# Patient Record
Sex: FEMALE | Race: WHITE | NOT HISPANIC OR LATINO | URBAN - METROPOLITAN AREA
[De-identification: names, ages, dates, MRNs, and addresses within clinical notes are randomized per-mention and may not be internally consistent; named-entity substitution may affect disease eponyms.]

---

## 2021-10-12 ENCOUNTER — INPATIENT (INPATIENT)
Facility: HOSPITAL | Age: 86
LOS: 6 days | Discharge: NURSING, REHAB OR LONG-TERM CARE | DRG: 312 | End: 2021-10-19
Attending: FAMILY MEDICINE | Admitting: FAMILY MEDICINE
Payer: COMMERCIAL

## 2021-10-12 VITALS
TEMPERATURE: 98 F | RESPIRATION RATE: 18 BRPM | SYSTOLIC BLOOD PRESSURE: 181 MMHG | WEIGHT: 139.99 LBS | DIASTOLIC BLOOD PRESSURE: 99 MMHG | HEART RATE: 85 BPM | OXYGEN SATURATION: 98 %

## 2021-10-12 DIAGNOSIS — R55 SYNCOPE AND COLLAPSE: ICD-10-CM

## 2021-10-12 LAB
ALBUMIN SERPL ELPH-MCNC: 3.5 G/DL — SIGNIFICANT CHANGE UP (ref 3.3–5)
ALP SERPL-CCNC: 71 U/L — SIGNIFICANT CHANGE UP (ref 40–120)
ALT FLD-CCNC: 29 U/L — SIGNIFICANT CHANGE UP (ref 12–78)
ANION GAP SERPL CALC-SCNC: 3 MMOL/L — LOW (ref 5–17)
APTT BLD: 28 SEC — SIGNIFICANT CHANGE UP (ref 27.5–35.5)
AST SERPL-CCNC: 40 U/L — HIGH (ref 15–37)
BASOPHILS # BLD AUTO: 0.05 K/UL — SIGNIFICANT CHANGE UP (ref 0–0.2)
BASOPHILS NFR BLD AUTO: 0.4 % — SIGNIFICANT CHANGE UP (ref 0–2)
BILIRUB SERPL-MCNC: 0.5 MG/DL — SIGNIFICANT CHANGE UP (ref 0.2–1.2)
BUN SERPL-MCNC: 37 MG/DL — HIGH (ref 7–23)
CALCIUM SERPL-MCNC: 10.1 MG/DL — SIGNIFICANT CHANGE UP (ref 8.5–10.1)
CHLORIDE SERPL-SCNC: 106 MMOL/L — SIGNIFICANT CHANGE UP (ref 96–108)
CK MB BLD-MCNC: 2.9 % — SIGNIFICANT CHANGE UP (ref 0–3.5)
CK MB CFR SERPL CALC: 2.4 NG/ML — SIGNIFICANT CHANGE UP (ref 0–3.6)
CK SERPL-CCNC: 83 U/L — SIGNIFICANT CHANGE UP (ref 26–192)
CO2 SERPL-SCNC: 29 MMOL/L — SIGNIFICANT CHANGE UP (ref 22–31)
CREAT SERPL-MCNC: 1.1 MG/DL — SIGNIFICANT CHANGE UP (ref 0.5–1.3)
EOSINOPHIL # BLD AUTO: 0.08 K/UL — SIGNIFICANT CHANGE UP (ref 0–0.5)
EOSINOPHIL NFR BLD AUTO: 0.6 % — SIGNIFICANT CHANGE UP (ref 0–6)
GLUCOSE SERPL-MCNC: 144 MG/DL — HIGH (ref 70–99)
HCT VFR BLD CALC: 42.7 % — SIGNIFICANT CHANGE UP (ref 34.5–45)
HGB BLD-MCNC: 14.1 G/DL — SIGNIFICANT CHANGE UP (ref 11.5–15.5)
IMM GRANULOCYTES NFR BLD AUTO: 1.3 % — SIGNIFICANT CHANGE UP (ref 0–1.5)
INR BLD: 1.02 RATIO — SIGNIFICANT CHANGE UP (ref 0.88–1.16)
LYMPHOCYTES # BLD AUTO: 1.61 K/UL — SIGNIFICANT CHANGE UP (ref 1–3.3)
LYMPHOCYTES # BLD AUTO: 11.8 % — LOW (ref 13–44)
MCHC RBC-ENTMCNC: 30.9 PG — SIGNIFICANT CHANGE UP (ref 27–34)
MCHC RBC-ENTMCNC: 33 GM/DL — SIGNIFICANT CHANGE UP (ref 32–36)
MCV RBC AUTO: 93.4 FL — SIGNIFICANT CHANGE UP (ref 80–100)
MONOCYTES # BLD AUTO: 0.51 K/UL — SIGNIFICANT CHANGE UP (ref 0–0.9)
MONOCYTES NFR BLD AUTO: 3.7 % — SIGNIFICANT CHANGE UP (ref 2–14)
NEUTROPHILS # BLD AUTO: 11.18 K/UL — HIGH (ref 1.8–7.4)
NEUTROPHILS NFR BLD AUTO: 82.2 % — HIGH (ref 43–77)
NRBC # BLD: 0 /100 WBCS — SIGNIFICANT CHANGE UP (ref 0–0)
NT-PROBNP SERPL-SCNC: 196 PG/ML — SIGNIFICANT CHANGE UP (ref 0–450)
PLATELET # BLD AUTO: 248 K/UL — SIGNIFICANT CHANGE UP (ref 150–400)
POTASSIUM SERPL-MCNC: 4.7 MMOL/L — SIGNIFICANT CHANGE UP (ref 3.5–5.3)
POTASSIUM SERPL-SCNC: 4.7 MMOL/L — SIGNIFICANT CHANGE UP (ref 3.5–5.3)
PROT SERPL-MCNC: 8 G/DL — SIGNIFICANT CHANGE UP (ref 6–8.3)
PROTHROM AB SERPL-ACNC: 11.9 SEC — SIGNIFICANT CHANGE UP (ref 10.6–13.6)
RBC # BLD: 4.57 M/UL — SIGNIFICANT CHANGE UP (ref 3.8–5.2)
RBC # FLD: 13.2 % — SIGNIFICANT CHANGE UP (ref 10.3–14.5)
SARS-COV-2 RNA SPEC QL NAA+PROBE: SIGNIFICANT CHANGE UP
SODIUM SERPL-SCNC: 138 MMOL/L — SIGNIFICANT CHANGE UP (ref 135–145)
TROPONIN I SERPL-MCNC: <.015 NG/ML — SIGNIFICANT CHANGE UP (ref 0.01–0.04)
WBC # BLD: 13.61 K/UL — HIGH (ref 3.8–10.5)
WBC # FLD AUTO: 13.61 K/UL — HIGH (ref 3.8–10.5)

## 2021-10-12 PROCEDURE — 72125 CT NECK SPINE W/O DYE: CPT | Mod: 26,MG

## 2021-10-12 PROCEDURE — 71260 CT THORAX DX C+: CPT | Mod: 26,MG

## 2021-10-12 PROCEDURE — G1004: CPT

## 2021-10-12 PROCEDURE — 70450 CT HEAD/BRAIN W/O DYE: CPT | Mod: 26,MG

## 2021-10-12 PROCEDURE — 71045 X-RAY EXAM CHEST 1 VIEW: CPT | Mod: 26

## 2021-10-12 PROCEDURE — 99285 EMERGENCY DEPT VISIT HI MDM: CPT

## 2021-10-12 PROCEDURE — 74177 CT ABD & PELVIS W/CONTRAST: CPT | Mod: 26,MG

## 2021-10-12 PROCEDURE — 99222 1ST HOSP IP/OBS MODERATE 55: CPT

## 2021-10-12 RX ORDER — CHOLECALCIFEROL (VITAMIN D3) 125 MCG
1 CAPSULE ORAL
Qty: 0 | Refills: 0 | DISCHARGE

## 2021-10-12 RX ORDER — MORPHINE SULFATE 50 MG/1
4 CAPSULE, EXTENDED RELEASE ORAL ONCE
Refills: 0 | Status: DISCONTINUED | OUTPATIENT
Start: 2021-10-12 | End: 2021-10-12

## 2021-10-12 RX ORDER — ASPIRIN/CALCIUM CARB/MAGNESIUM 324 MG
81 TABLET ORAL DAILY
Refills: 0 | Status: DISCONTINUED | OUTPATIENT
Start: 2021-10-12 | End: 2021-10-14

## 2021-10-12 RX ORDER — ASCORBIC ACID 60 MG
1 TABLET,CHEWABLE ORAL
Qty: 0 | Refills: 0 | DISCHARGE

## 2021-10-12 RX ORDER — ACETAMINOPHEN 500 MG
1000 TABLET ORAL ONCE
Refills: 0 | Status: COMPLETED | OUTPATIENT
Start: 2021-10-12 | End: 2021-10-12

## 2021-10-12 RX ORDER — LIDOCAINE 4 G/100G
1 CREAM TOPICAL
Qty: 0 | Refills: 0 | DISCHARGE

## 2021-10-12 RX ORDER — HEPARIN SODIUM 5000 [USP'U]/ML
5000 INJECTION INTRAVENOUS; SUBCUTANEOUS EVERY 12 HOURS
Refills: 0 | Status: DISCONTINUED | OUTPATIENT
Start: 2021-10-12 | End: 2021-10-14

## 2021-10-12 RX ORDER — LANOLIN/MINERAL OIL
1 LOTION (ML) TOPICAL
Qty: 0 | Refills: 0 | DISCHARGE

## 2021-10-12 RX ORDER — SODIUM CHLORIDE 9 MG/ML
1000 INJECTION INTRAMUSCULAR; INTRAVENOUS; SUBCUTANEOUS ONCE
Refills: 0 | Status: COMPLETED | OUTPATIENT
Start: 2021-10-12 | End: 2021-10-12

## 2021-10-12 RX ORDER — ACETAMINOPHEN 500 MG
650 TABLET ORAL EVERY 6 HOURS
Refills: 0 | Status: DISCONTINUED | OUTPATIENT
Start: 2021-10-12 | End: 2021-10-13

## 2021-10-12 RX ORDER — CHOLECALCIFEROL (VITAMIN D3) 125 MCG
1000 CAPSULE ORAL DAILY
Refills: 0 | Status: DISCONTINUED | OUTPATIENT
Start: 2021-10-12 | End: 2021-10-19

## 2021-10-12 RX ORDER — ASCORBIC ACID 60 MG
500 TABLET,CHEWABLE ORAL DAILY
Refills: 0 | Status: DISCONTINUED | OUTPATIENT
Start: 2021-10-12 | End: 2021-10-19

## 2021-10-12 RX ORDER — ASPIRIN/CALCIUM CARB/MAGNESIUM 324 MG
1 TABLET ORAL
Qty: 0 | Refills: 0 | DISCHARGE

## 2021-10-12 RX ORDER — METHOCARBAMOL 500 MG/1
500 TABLET, FILM COATED ORAL THREE TIMES A DAY
Refills: 0 | Status: DISCONTINUED | OUTPATIENT
Start: 2021-10-12 | End: 2021-10-19

## 2021-10-12 RX ORDER — HYDRALAZINE HCL 50 MG
10 TABLET ORAL ONCE
Refills: 0 | Status: COMPLETED | OUTPATIENT
Start: 2021-10-12 | End: 2021-10-12

## 2021-10-12 RX ADMIN — Medication 1000 MILLIGRAM(S): at 13:23

## 2021-10-12 RX ADMIN — Medication 650 MILLIGRAM(S): at 20:33

## 2021-10-12 RX ADMIN — Medication 400 MILLIGRAM(S): at 13:18

## 2021-10-12 RX ADMIN — Medication 650 MILLIGRAM(S): at 18:45

## 2021-10-12 RX ADMIN — SODIUM CHLORIDE 1000 MILLILITER(S): 9 INJECTION INTRAMUSCULAR; INTRAVENOUS; SUBCUTANEOUS at 11:00

## 2021-10-12 RX ADMIN — MORPHINE SULFATE 4 MILLIGRAM(S): 50 CAPSULE, EXTENDED RELEASE ORAL at 10:59

## 2021-10-12 RX ADMIN — HEPARIN SODIUM 5000 UNIT(S): 5000 INJECTION INTRAVENOUS; SUBCUTANEOUS at 18:45

## 2021-10-12 RX ADMIN — SODIUM CHLORIDE 1000 MILLILITER(S): 9 INJECTION INTRAMUSCULAR; INTRAVENOUS; SUBCUTANEOUS at 12:00

## 2021-10-12 RX ADMIN — Medication 10 MILLIGRAM(S): at 17:12

## 2021-10-12 RX ADMIN — MORPHINE SULFATE 4 MILLIGRAM(S): 50 CAPSULE, EXTENDED RELEASE ORAL at 11:14

## 2021-10-12 RX ADMIN — METHOCARBAMOL 500 MILLIGRAM(S): 500 TABLET, FILM COATED ORAL at 20:35

## 2021-10-12 NOTE — H&P ADULT - ASSESSMENT
KELSI LONGORIA is a 89 YO Female brought to ED after syncope episode.  Patient reports she was having a bowel movement in which she next woke up on the floor.

## 2021-10-12 NOTE — CONSULT NOTE ADULT - SUBJECTIVE AND OBJECTIVE BOX
Patient is a 89y old  Female who presents with a chief complaint of     HPI: 89 yo female with PMH HTN and glaucoma presents to ED after syncopal episode. Patient reports sleeping poorly last night, waking up to get ready for work this morning and needing to use the bathroom several times. Patient reports several normal BMs this morning s/p taking laxatives yesterday for chronic constipation. Patient reports she felt tired however denies other prodromal symptoms. Pt went to the bathroom where she suddenly lost consciousness Patient doesn't remember much however states she believes she was getting up after using the toilet and suddenly passed out. Patient came to without any idea how long she had been unconscious and began calling for her daughter Her daughter found her sitting in the hallway without her undergarments on. Daughter states she flushed the toilet, got the pt into a chair and deciding to come to ED. As per daughter, pt has hx of previous syncopal episodes in her 20s without a diagnosable etiology. Last episode was 2012 where patient had overeaten at a very small tight restaurant and suddenly passed out. She was taken to [place] however felt better and left before a workup was completed. Patient doesn't have a cardiologist, checks up with PCP only. Reports stress test and carotid US negative within the past 5 years. Denies other cardiac history.       PAST MEDICAL & SURGICAL HISTORY:  HTN (hypertension)    Glaucoma      MEDICATIONS  (STANDING):    MEDICATIONS  (PRN):      FAMILY HISTORY:  Denies Family history of CAD or early MI    Constitutional: denies fever, chills  HEENT: denies blurry vision, difficulty hearing  Respiratory: denies SOB, BESS, cough  Cardiovascular: denies CP, palpitations, orthopnea, PND, LE edema  Gastrointestinal: denies nausea, vomiting, abdominal pain. Admits epigastric pain.   Genitourinary: denies urinary changes  Skin: Denies rashes, itching  Neurologic: denies headache, weakness, dizziness  Hematology/Oncology: denies bleeding, easy bruising    SOCIAL HISTORY: No tobacco, Alcohol or Drug use    Vital Signs Last 24 Hrs  T(C): 36.8 (12 Oct 2021 12:15), Max: 36.8 (12 Oct 2021 12:15)  T(F): 98.2 (12 Oct 2021 12:15), Max: 98.2 (12 Oct 2021 12:15)  HR: 70 (12 Oct 2021 12:15) (70 - 85)  BP: 168/82 (12 Oct 2021 12:15) (168/82 - 181/99)  BP(mean): --  RR: 17 (12 Oct 2021 12:15) (17 - 18)  SpO2: 95% (12 Oct 2021 12:15) (95% - 98%)    Physical Exam:  General: Well developed, well nourished, NAD  HEENT: NCAT, EOMI bl, moist mucous membranes   Neck: Supple, nontender, no mass  Neurology: A&Ox3, nonfocal, sensation intact   Respiratory: CTA B/L, No W/R/R  CV: RRR, +S1/S2, no murmurs, rubs or gallops  Abdominal: Soft, NT, ND +BSx4, no palpable masses  Extremities: No C/C/E, + peripheral pulses  MSK: Normal ROM, no joint erythema or warmth, no joint swelling   Heme: No obvious ecchymosis or petechiae   Skin: warm, dry, normal color    ECG:    I&O's Detail      LABS:                        14.1   13.61 )-----------( 248      ( 12 Oct 2021 11:21 )             42.7     10-12    138  |  106  |  37<H>  ----------------------------<  144<H>  4.7   |  29  |  1.10    Ca    10.1      12 Oct 2021 11:21    TPro  8.0  /  Alb  3.5  /  TBili  0.5  /  DBili  x   /  AST  40<H>  /  ALT  29  /  AlkPhos  71  10-12    CARDIAC MARKERS ( 12 Oct 2021 11:21 )  <.015 ng/mL / x     / x     / x     / x          PT/INR - ( 12 Oct 2021 11:21 )   PT: 11.9 sec;   INR: 1.02 ratio         PTT - ( 12 Oct 2021 11:21 )  PTT:28.0 sec    I&O's Summary    BNPSerum Pro-Brain Natriuretic Peptide: 196 pg/mL (10-12 @ 11:21)    RADIOLOGY & ADDITIONAL STUDIES: Patient is a 89y old  Female who presents with a chief complaint of     HPI: 89 yo female with PMH HTN and glaucoma presents to ED after syncopal episode. Patient reports sleeping poorly last night, waking up to get ready for work this morning and needing to use the bathroom several times. Patient reports eating her usual breakfast. Patient reports several normal BMs this morning s/p taking laxatives yesterday for chronic constipation. Patient reports she felt tired however denies other prodromal symptoms. Pt went to the bathroom where she suddenly lost consciousness Patient doesn't remember much however states she believes she was getting up after using the toilet and suddenly passed out. Patient came to without any idea how long she had been unconscious and began calling for her daughter Her daughter found her sitting in the hallway without her undergarments on. Daughter states she flushed the toilet, got the pt into a chair and deciding to come to ED. As per daughter, pt has hx of previous syncopal episodes in her 20s without a diagnosable etiology. Last episode was  where patient had overeaten at a very small tight restaurant and suddenly passed out. She was taken to [place] however felt better and left before a workup was completed. Patient doesn't have a cardiologist, checks up with PCP only. Reports stress test and carotid US negative within the past 5 years. Denies other cardiac history. Denies chest pain, palpitations, dizziness lightheaded, shortness of breath.       PAST MEDICAL & SURGICAL HISTORY:  HTN (hypertension)    Glaucoma      MEDICATIONS  (STANDING):    MEDICATIONS  (PRN):      FAMILY HISTORY:  Denies Family history of CAD or early MI    Constitutional: denies fever, chills  HEENT: denies blurry vision, difficulty hearing  Respiratory: denies SOB, BESS, cough  Cardiovascular: denies CP, palpitations, orthopnea, PND, LE edema  Gastrointestinal: denies nausea, vomiting, abdominal pain. Admits epigastric pain.   Genitourinary: denies urinary changes  Skin: Denies rashes, itching  Neurologic: denies headache, weakness, dizziness  Hematology/Oncology: denies bleeding, easy bruising    SOCIAL HISTORY: No tobacco, Alcohol or Drug use    Vital Signs Last 24 Hrs  T(C): 36.8 (12 Oct 2021 12:15), Max: 36.8 (12 Oct 2021 12:15)  T(F): 98.2 (12 Oct 2021 12:15), Max: 98.2 (12 Oct 2021 12:15)  HR: 70 (12 Oct 2021 12:15) (70 - 85)  BP: 168/82 (12 Oct 2021 12:15) (168/82 - 181/99)  BP(mean): --  RR: 17 (12 Oct 2021 12:15) (17 - 18)  SpO2: 95% (12 Oct 2021 12:15) (95% - 98%)    Physical Exam:  General: Well developed, well nourished, NAD  HEENT: NCAT, EOMI bl, moist mucous membranes   Neck: Supple, nontender, no mass  Neurology: A&Ox3, nonfocal, sensation intact   Respiratory: CTA B/L, No W/R/R  CV: RRR, +S1/S2, no murmurs, rubs or gallops  Abdominal: Soft, NT, ND +BSx4, no palpable masses  Extremities: No C/C/E, + peripheral pulses  MSK: Normal ROM, no joint erythema or warmth, no joint swelling   Heme: No obvious ecchymosis or petechiae   Skin: warm, dry, normal color    ECst degree AV block, LVH. No ST wave abnormalities.     I&O's Detail      LABS:                        14.1   13.61 )-----------( 248      ( 12 Oct 2021 11:21 )             42.7     10-12    138  |  106  |  37<H>  ----------------------------<  144<H>  4.7   |  29  |  1.10    Ca    10.1      12 Oct 2021 11:21    TPro  8.0  /  Alb  3.5  /  TBili  0.5  /  DBili  x   /  AST  40<H>  /  ALT  29  /  AlkPhos  71  10-12    CARDIAC MARKERS ( 12 Oct 2021 11:21 )  <.015 ng/mL / x     / x     / x     / x          PT/INR - ( 12 Oct 2021 11:21 )   PT: 11.9 sec;   INR: 1.02 ratio         PTT - ( 12 Oct 2021 11:21 )  PTT:28.0 sec    I&O's Summary    BNPSerum Pro-Brain Natriuretic Peptide: 196 pg/mL (10-12 @ 11:21)    RADIOLOGY & ADDITIONAL STUDIES: Patient is a 89y old  Female who presents with a chief complaint of     HPI: 89 yo female with PMH HTN and glaucoma presents to ED after syncopal episode. Patient reports sleeping poorly last night, waking up to get ready for work this morning and needing to use the bathroom several times. Patient reports eating her usual breakfast. Patient reports several normal BMs this morning s/p taking laxatives yesterday for chronic constipation. Patient reports she felt tired however denies other prodromal symptoms. Pt went to the bathroom where she suddenly lost consciousness Patient doesn't remember much however states she believes she was getting up after using the toilet and suddenly passed out. Patient came to without any idea how long she had been unconscious and began calling for her daughter Her daughter found her sitting in the hallway without her undergarments on. Daughter states she flushed the toilet, got the pt into a chair and deciding to come to ED. As per daughter, pt has hx of previous syncopal episodes in her 20s without a diagnosable etiology. Last episode was  where patient had overeaten at a very small tight restaurant and suddenly passed out. She was taken to [place] however felt better and left before a workup was completed. Patient doesn't have a cardiologist, checks up with PCP only. Reports stress test and carotid US negative within the past 5 years. Denies other cardiac history. Denies chest pain, palpitations, dizziness lightheaded, shortness of breath. Patient now complaining of severe epigastric pain worse with movement however stable at rest after morphine and ofirmev.       PAST MEDICAL & SURGICAL HISTORY:  HTN (hypertension)    Glaucoma      MEDICATIONS  (STANDING):    MEDICATIONS  (PRN):      FAMILY HISTORY:  Denies Family history of CAD or early MI    Constitutional: denies fever, chills  HEENT: denies blurry vision, difficulty hearing  Respiratory: denies SOB, BESS, cough  Cardiovascular: denies CP, palpitations, orthopnea, PND, LE edema  Gastrointestinal: denies nausea, vomiting, abdominal pain. Admits epigastric pain.   Genitourinary: denies urinary changes  Skin: Denies rashes, itching  Neurologic: denies headache, weakness, dizziness  Hematology/Oncology: denies bleeding, easy bruising    SOCIAL HISTORY: No tobacco, Alcohol or Drug use    Vital Signs Last 24 Hrs  T(C): 36.8 (12 Oct 2021 12:15), Max: 36.8 (12 Oct 2021 12:15)  T(F): 98.2 (12 Oct 2021 12:15), Max: 98.2 (12 Oct 2021 12:15)  HR: 70 (12 Oct 2021 12:15) (70 - 85)  BP: 168/82 (12 Oct 2021 12:15) (168/82 - 181/99)  BP(mean): --  RR: 17 (12 Oct 2021 12:15) (17 - 18)  SpO2: 95% (12 Oct 2021 12:15) (95% - 98%)    Physical Exam:  General: Well developed, well nourished, NAD  HEENT: NCAT, EOMI bl, moist mucous membranes   Neck: Supple, nontender, no mass  Neurology: A&Ox3, nonfocal, sensation intact   Respiratory: CTA B/L, No W/R/R  CV: RRR, +S1/S2, no murmurs, rubs or gallops  Abdominal: Soft, NT, ND +BSx4, no palpable masses  Extremities: No C/C/E, + peripheral pulses  MSK: Normal ROM, no joint erythema or warmth, no joint swelling   Heme: No obvious ecchymosis or petechiae   Skin: warm, dry, normal color    ECst degree AV block, LVH. No ST wave abnormalities.     I&O's Detail      LABS:                        14.1   13.61 )-----------( 248      ( 12 Oct 2021 11:21 )             42.7     10-12    138  |  106  |  37<H>  ----------------------------<  144<H>  4.7   |  29  |  1.10    Ca    10.1      12 Oct 2021 11:21    TPro  8.0  /  Alb  3.5  /  TBili  0.5  /  DBili  x   /  AST  40<H>  /  ALT  29  /  AlkPhos  71  10-12    CARDIAC MARKERS ( 12 Oct 2021 11:21 )  <.015 ng/mL / x     / x     / x     / x          PT/INR - ( 12 Oct 2021 11:21 )   PT: 11.9 sec;   INR: 1.02 ratio         PTT - ( 12 Oct 2021 11:21 )  PTT:28.0 sec    I&O's Summary    BNPSerum Pro-Brain Natriuretic Peptide: 196 pg/mL (10-12 @ 11:21)    RADIOLOGY & ADDITIONAL STUDIES:

## 2021-10-12 NOTE — ED ADULT NURSE NOTE - NSIMPLEMENTINTERV_GEN_ALL_ED
Implemented All Fall Risk Interventions:  [place] to call system. Call bell, personal items and telephone within reach. Instruct patient to call for assistance. Room bathroom lighting operational. Non-slip footwear when patient is off stretcher. Physically safe environment: no spills, clutter or unnecessary equipment. Stretcher in lowest position, wheels locked, appropriate side rails in place. Provide visual cue, wrist band, yellow gown, etc. Monitor gait and stability. Monitor for mental status changes and reorient to person, place, and time. Review medications for side effects contributing to fall risk. Reinforce activity limits and safety measures with patient and family.

## 2021-10-12 NOTE — ED PROVIDER NOTE - CLINICAL SUMMARY MEDICAL DECISION MAKING FREE TEXT BOX
presents today due to syncope. pt reports she was having a bowel movement in which she next woke up on the floor. pt reports she has a hx of passing out but has not had any cardiac hx. pt reports she woke up with b/l rib/flank pain and chest pain. no prodromal symptoms. plan includes labs, EKG/troponin r/o CAD, CXR r/o pneumothorax/rib fx, Ct head r/o CVA, Ct cervical r/o fx, CT CAB r/o intrathoracic injury/fx, re-assess

## 2021-10-12 NOTE — ED PROVIDER NOTE - OBJECTIVE STATEMENT
89 y/o female with PMHx HTN and Glaucoma presents today due to syncope. pt reports she was having a bowel movement in which she next woke up on the floor. pt reports she has a hx of passing out but has not had any cardiac hx. pt reports she woke up with b/l rib/flank pain and chest pain. pt reports she did not have chest pain or any symptoms prior to waking up on floor. pt currently taking daily baby ASA. pt denies vomiting, headache, neck pain, abd pain, fever, diarrhea, numbness/weakness, slurred speech, or any other complaints.

## 2021-10-12 NOTE — ED PROVIDER NOTE - ATTENDING CONTRIBUTION TO CARE
89 yo F p/w was on toilet today, having a BM. Pt then woke up on floor. No known sx prior to fall. pt co mild HA, chest wall pain sp fall onto ground. No other recent CP. no BESS / easy recent fatigue. no fever/chills. no cough / uri. no recent illness. no neck pain / stiffness. no back pain. no ext pain or inj. no other recent trauma.  No other recent sx. Pt with no hx cardiac disease. NO other acute co.  exam; MM Moist. neck supple. no meningeal signs. abd soft NT. no hsm. no cvat. nl resp effort. no w/r/r. Mild tend to bl lat ribs, no crepitus. No other chest wall tend. no signs of head trauma. no spinal tend (c,t,l). FROM bl ext x 4, nl non-focal neuro exam. no other acute findings.  check labs, trop, trauma CT, outpt fu

## 2021-10-12 NOTE — H&P ADULT - RS GEN PE MLT RESP DETAILS PC
airway patent/breath sounds equal/good air movement/respirations non-labored/diminished breath sounds, L/diminished breath sounds, R

## 2021-10-12 NOTE — ED ADULT NURSE NOTE - OBJECTIVE STATEMENT
Presents to ER with syncope. Passed out on toilet bowl, awoke on the floor and crawled out into the hallway to get help from her daughter.  Denies any headaches, dizziness, or SOB. Pt states she has bilat rib pains. Presents to ER with syncope. Passed out on toilet bowl, awoke on the floor and crawled out into the hallway to get help from her daughter.  Denies any headaches, dizziness, or SOB. Pt states she has bilat rib pains and severe epigastric pain.  Placed on continuous cardiac monitoring.

## 2021-10-12 NOTE — CONSULT NOTE ADULT - ATTENDING COMMENTS
syncope overall seems most likely to be on the basis of dehydration and perhaps a vasovagal phenomenon  she has a first degree avb on ekg, which is likely not material  she has severe pain with ambulation and cannot clearly walk and be safe at home, and so is to be admitted  echo, tele while admitted  bp was initially high, improved somewhat and then spiked higher again  resume her usual home medication for htn  can utilize hydralazine 10mg iv q6 as needed for sustained sbp>170

## 2021-10-12 NOTE — ED ADULT NURSE REASSESSMENT NOTE - NS ED NURSE REASSESS COMMENT FT1
pt reavaluated  and vital signs stable admitted to tele report given transfer with cardiac monitor in no distress

## 2021-10-12 NOTE — H&P ADULT - HISTORY OF PRESENT ILLNESS
MA        · Chief Complaint: The patient is a 89y Female complaining of syncope.  · HPI Objective Statement: 89 y/o female with PMHx HTN and Glaucoma presents today due to syncope. pt reports she was having a bowel movement in which she next woke up on the floor. pt reports she has a hx of passing out but has not had any cardiac hx. pt reports she woke up with b/l rib/flank pain and chest pain. pt reports she did not have chest pain or any symptoms prior to waking up on floor. pt currently taking daily baby ASA. pt denies vomiting, headache, neck pain, abd pain, fever, diarrhea, numbness/weakness, slurred speech, or any other complaints.   KELSI LONGORIA is a 89 YO Female brought to ED after syncope episode.  Patient reports she was having a bowel movement in which she next woke up on the floor. Patient reports she has a history of passing out but has not had any cardiac hx. Patient reports she woke up with b/l rib/flank pain and chest pain. Patient reports she did not have chest pain or any symptoms prior to waking up on floor. Patient currently taking daily baby ASA. Patient denies vomiting, headache, neck pain, abd pain, fever, diarrhea, numbness/weakness, slurred speech, or any other complaints.  Patient with Past Medical History of HTN and Glaucoma.

## 2021-10-12 NOTE — CONSULT NOTE ADULT - ASSESSMENT
89 yo female with PMH HTN and glaucoma presents to ED after syncopal episode. Cariology consulted for syncope.     Syncope  - Possibly vaso vagal episode in the setting of repeat BMs  - Trops negative, unlikely ischemic event   - BNP wnl, no signs of volume overload  - EKG 1st degree sinus, unable to compare as no prior EKG on EMR  -     89 yo female with PMH HTN and glaucoma presents to ED after syncopal episode. Cariology consulted for syncope.     Syncope  - Possibly vaso vagal episode in the setting of repeat BMs  - Trops negative, unlikely ischemic event   - BNP wnl, no signs of volume overload  - EKG 1st degree sinus, unable to compare as no prior EKG on EMR  -      Epigastric pain  - Doesn't appear to be cardiac in nature.   - CT abd/pelvis showing "No evidence of acute visceral organ or bony injury. Intra and extrahepatic biliary dilatation and pancreatic ductal dilatation, likely related to the patient's stated age and a postcholecystectomy reservoir effect."     89 yo female with PMH HTN and glaucoma presents to ED after syncopal episode. Cariology consulted for syncope.     Syncope  - Possibly vaso vagal episode in the setting of repeat BMs  - Trops negative, unlikely ischemic event   - BNP wnl, no signs of volume overload  - EKG 1st degree sinus, unable to compare as no prior EKG on EMR  - No echo ever performed at per pt. Stress and carotid US <5 years ago wnl.   - No cardiac symptoms at this time.   - F/u outpt for routine workup    Epigastric pain  - Doesn't appear to be cardiac in nature.   - CT abd/pelvis showing "No evidence of acute visceral organ or bony injury. Intra and extrahepatic biliary dilatation and pancreatic ductal dilatation, likely related to the patient's stated age and a postcholecystectomy reservoir effect."  - Would recommend pain control     HTN  - BP well controlled, continue home enalipril   89 yo female with PMH HTN and glaucoma presents to ED after syncopal episode. Cariology consulted for syncope.     Syncope  - Possibly 2/2 vaso vagal episode in the setting of repeat BMs vs EKG changes vs dehydration 2/2 decreased PO intake. BUN elevated both outpatient and on hospital presentation.  - Trops negative, unlikely ischemic event   - BNP wnl, no signs of volume overload  - EKG 1st degree sinus, unable to compare as no prior EKG on EMR. Possible contributing factor to syncope. Would need halter monitor in out patient setting to confirm.   - No echo ever performed at per pt. Stress and carotid US <5 years ago wnl.   - No cardiac symptoms at this time.   - Would recommend routine echo and tele monitoring if pt admitted however from cardiac standpoint, no need for hospital admission. Patient can otherwise follow up outpatient cardiology for further workup.     Epigastric pain  - Doesn't appear to be cardiac in nature.   - CT abd/pelvis showing "No evidence of acute visceral organ or bony injury. Intra and extrahepatic biliary dilatation and pancreatic ductal dilatation, likely related to the patient's stated age and a postcholecystectomy reservoir effect."  - Would recommend pain control and ambulation before discharge from ED    HTN  - BP well controlled, continue home enalipril

## 2021-10-12 NOTE — ED PROVIDER NOTE - PHYSICAL EXAMINATION
Constitutional: Awake, Alert, non-toxic. NAD. Well appearing, well nourished.   HEAD: Normocephalic, atraumatic.   EYES: PERRL, EOM intact, conjunctiva and sclera are clear bilaterally. No raccoon eyes.   ENT: No rhinorrhea, normal pharynx, patent, no tonsillar exudate or enlargement, mucous membranes pink/moist, no erythema, no drooling or stridor.   NECK: Supple, non-tender  BACK: No midline or paraspinal TTP of cervical/thoracic/lumbar spine, FROM. No ecchymosis or hematomas. (+) mild left lateral rib TTP  CARDIOVASCULAR: Normal S1, S2; regular rate and rhythm.  RESPIRATORY: Normal respiratory effort; breath sounds CTAB, no wheezes, rhonchi, or rales. Speaking in full sentences. No accessory muscle use.   ABDOMEN: Soft; non-tender, non-distended.  EXTREMITIES: Full passive and active ROM in all extremities; hips non-tender to palpation; distal pulses palpable and symmetric, no edema, no crepitus or step off  SKIN: Warm, dry; good skin turgor, no apparent lesions or rashes, no ecchymosis, brisk capillary refill.  NEURO: A&O x3. Sensory and motor functions are grossly intact. Speech is normal. Appearance and judgement seem appropriate for gender and age. No neurological deficits. Neurovascular sensation intact motor function 5/5 of upper and lower extremities, CN II-XII grossly intact, no ataxia, absent pronator drift, intact cerebellar function. Speech clear, without articulation or word-finding difficulties. Eyes- PERRL bilaterally. EOMs in tact. No nystagmus. No facial droop.

## 2021-10-12 NOTE — H&P ADULT - NSHPLABSRESULTS_GEN_ALL_CORE
14.1   13.61 )-----------( 248      ( 12 Oct 2021 11:21 )             42.7     12 Oct 2021 11:21    138    |  106    |  37     ----------------------------<  144    4.7     |  29     |  1.10     Ca    10.1       12 Oct 2021 11:21    TPro  8.0    /  Alb  3.5    /  TBili  0.5    /  DBili  x      /  AST  40     /  ALT  29     /  AlkPhos  71     12 Oct 2021 11:21    LIVER FUNCTIONS - ( 12 Oct 2021 11:21 )  Alb: 3.5 g/dL / Pro: 8.0 g/dL / ALK PHOS: 71 U/L / ALT: 29 U/L / AST: 40 U/L / GGT: x           PT/INR - ( 12 Oct 2021 11:21 )   PT: 11.9 sec;   INR: 1.02 ratio       PTT - ( 12 Oct 2021 11:21 )  PTT:28.0 sec  CAPILLARY BLOOD GLUCOSE    CARDIAC MARKERS ( 12 Oct 2021 17:36 )  x     / x     / x     / x     / 2.4 ng/mL  CARDIAC MARKERS ( 12 Oct 2021 17:33 )  <.015 ng/mL / x     / 83 U/L / x     / x      CARDIAC MARKERS ( 12 Oct 2021 11:21 )  <.015 ng/mL / x     / x     / x     / x        < from: CT Abdomen and Pelvis w/ IV Cont (10.12.21 @ 12:44) >    IMPRESSION: No evidence of acute visceral organ or bony injury. Intra and extrahepatic biliary dilatation and pancreatic ductal dilatation, likely related to the patient's stated age and a postcholecystectomy reservoir effect.    --- End of Report ---    < end of copied text >    < from: CT Cervical Spine No Cont (10.12.21 @ 12:45) >    IMPRESSION:    No acute fracture identified.    < end of copied text >    < from: CT Head No Cont (10.12.21 @ 12:45) >      IMPRESSION:    1)  chronic ischemic changes noted in both hemispheres with volume loss. No acute abnormality or hemorrhagic lesion noted.  2)  clear sinuses and mastoids..    < end of copied text >    < from: CT Chest w/ IV Cont (10.12.21 @ 12:50) >    IMPRESSION: No evidence of acute visceral organ or bony injury. Intra and extrahepatic biliary dilatation and pancreatic ductal dilatation, likely related to the patient's stated age and a postcholecystectomy reservoir effect.    < end of copied text >

## 2021-10-13 LAB
A1C WITH ESTIMATED AVERAGE GLUCOSE RESULT: 6.3 % — HIGH (ref 4–5.6)
ALBUMIN SERPL ELPH-MCNC: 3.4 G/DL — SIGNIFICANT CHANGE UP (ref 3.3–5)
ALP SERPL-CCNC: 84 U/L — SIGNIFICANT CHANGE UP (ref 40–120)
ALT FLD-CCNC: 73 U/L — SIGNIFICANT CHANGE UP (ref 12–78)
ANION GAP SERPL CALC-SCNC: 7 MMOL/L — SIGNIFICANT CHANGE UP (ref 5–17)
AST SERPL-CCNC: 91 U/L — HIGH (ref 15–37)
BILIRUB SERPL-MCNC: 1.1 MG/DL — SIGNIFICANT CHANGE UP (ref 0.2–1.2)
BUN SERPL-MCNC: 34 MG/DL — HIGH (ref 7–23)
CALCIUM SERPL-MCNC: 9.6 MG/DL — SIGNIFICANT CHANGE UP (ref 8.5–10.1)
CHLORIDE SERPL-SCNC: 104 MMOL/L — SIGNIFICANT CHANGE UP (ref 96–108)
CHOLEST SERPL-MCNC: 244 MG/DL — HIGH
CO2 SERPL-SCNC: 27 MMOL/L — SIGNIFICANT CHANGE UP (ref 22–31)
CREAT SERPL-MCNC: 0.96 MG/DL — SIGNIFICANT CHANGE UP (ref 0.5–1.3)
ESTIMATED AVERAGE GLUCOSE: 134 MG/DL — HIGH (ref 68–114)
GLUCOSE SERPL-MCNC: 91 MG/DL — SIGNIFICANT CHANGE UP (ref 70–99)
HCT VFR BLD CALC: 41.6 % — SIGNIFICANT CHANGE UP (ref 34.5–45)
HDLC SERPL-MCNC: 58 MG/DL — SIGNIFICANT CHANGE UP
HGB BLD-MCNC: 13.8 G/DL — SIGNIFICANT CHANGE UP (ref 11.5–15.5)
LIPID PNL WITH DIRECT LDL SERPL: 164 MG/DL — HIGH
MCHC RBC-ENTMCNC: 30.9 PG — SIGNIFICANT CHANGE UP (ref 27–34)
MCHC RBC-ENTMCNC: 33.2 GM/DL — SIGNIFICANT CHANGE UP (ref 32–36)
MCV RBC AUTO: 93.3 FL — SIGNIFICANT CHANGE UP (ref 80–100)
MYOGLOBIN SERPL-MCNC: 129 NG/ML — HIGH (ref 25–58)
NON HDL CHOLESTEROL: 186 MG/DL — HIGH
NRBC # BLD: 0 /100 WBCS — SIGNIFICANT CHANGE UP (ref 0–0)
PLATELET # BLD AUTO: 219 K/UL — SIGNIFICANT CHANGE UP (ref 150–400)
POTASSIUM SERPL-MCNC: 4.2 MMOL/L — SIGNIFICANT CHANGE UP (ref 3.5–5.3)
POTASSIUM SERPL-SCNC: 4.2 MMOL/L — SIGNIFICANT CHANGE UP (ref 3.5–5.3)
PROT SERPL-MCNC: 7.4 G/DL — SIGNIFICANT CHANGE UP (ref 6–8.3)
RBC # BLD: 4.46 M/UL — SIGNIFICANT CHANGE UP (ref 3.8–5.2)
RBC # FLD: 13.3 % — SIGNIFICANT CHANGE UP (ref 10.3–14.5)
RHEUMATOID FACT SERPL-ACNC: 11 IU/ML — SIGNIFICANT CHANGE UP (ref 0–13)
SODIUM SERPL-SCNC: 138 MMOL/L — SIGNIFICANT CHANGE UP (ref 135–145)
T3 SERPL-MCNC: 86 NG/DL — SIGNIFICANT CHANGE UP (ref 80–200)
T4 AB SER-ACNC: 7 UG/DL — SIGNIFICANT CHANGE UP (ref 4.6–12)
TRIGL SERPL-MCNC: 109 MG/DL — SIGNIFICANT CHANGE UP
TSH SERPL-MCNC: 1.04 UIU/ML — SIGNIFICANT CHANGE UP (ref 0.36–3.74)
URATE SERPL-MCNC: 5 MG/DL — SIGNIFICANT CHANGE UP (ref 2.5–7)
WBC # BLD: 11.14 K/UL — HIGH (ref 3.8–10.5)
WBC # FLD AUTO: 11.14 K/UL — HIGH (ref 3.8–10.5)

## 2021-10-13 PROCEDURE — 93306 TTE W/DOPPLER COMPLETE: CPT | Mod: 26

## 2021-10-13 PROCEDURE — 99232 SBSQ HOSP IP/OBS MODERATE 35: CPT

## 2021-10-13 RX ORDER — TRAMADOL HYDROCHLORIDE 50 MG/1
25 TABLET ORAL EVERY 4 HOURS
Refills: 0 | Status: DISCONTINUED | OUTPATIENT
Start: 2021-10-13 | End: 2021-10-19

## 2021-10-13 RX ORDER — SODIUM CHLORIDE 9 MG/ML
1000 INJECTION INTRAMUSCULAR; INTRAVENOUS; SUBCUTANEOUS
Refills: 0 | Status: DISCONTINUED | OUTPATIENT
Start: 2021-10-13 | End: 2021-10-14

## 2021-10-13 RX ORDER — POLYETHYLENE GLYCOL 3350 17 G/17G
17 POWDER, FOR SOLUTION ORAL
Refills: 0 | Status: DISCONTINUED | OUTPATIENT
Start: 2021-10-13 | End: 2021-10-19

## 2021-10-13 RX ORDER — LIDOCAINE 4 G/100G
2 CREAM TOPICAL DAILY
Refills: 0 | Status: DISCONTINUED | OUTPATIENT
Start: 2021-10-13 | End: 2021-10-19

## 2021-10-13 RX ORDER — MORPHINE SULFATE 50 MG/1
2 CAPSULE, EXTENDED RELEASE ORAL ONCE
Refills: 0 | Status: DISCONTINUED | OUTPATIENT
Start: 2021-10-13 | End: 2021-10-13

## 2021-10-13 RX ORDER — ACETAMINOPHEN 500 MG
650 TABLET ORAL EVERY 6 HOURS
Refills: 0 | Status: DISCONTINUED | OUTPATIENT
Start: 2021-10-13 | End: 2021-10-19

## 2021-10-13 RX ADMIN — LIDOCAINE 2 PATCH: 4 CREAM TOPICAL at 19:18

## 2021-10-13 RX ADMIN — Medication 650 MILLIGRAM(S): at 12:01

## 2021-10-13 RX ADMIN — TRAMADOL HYDROCHLORIDE 25 MILLIGRAM(S): 50 TABLET ORAL at 18:00

## 2021-10-13 RX ADMIN — Medication 1000 UNIT(S): at 12:01

## 2021-10-13 RX ADMIN — METHOCARBAMOL 500 MILLIGRAM(S): 500 TABLET, FILM COATED ORAL at 02:23

## 2021-10-13 RX ADMIN — SODIUM CHLORIDE 50 MILLILITER(S): 9 INJECTION INTRAMUSCULAR; INTRAVENOUS; SUBCUTANEOUS at 15:23

## 2021-10-13 RX ADMIN — HEPARIN SODIUM 5000 UNIT(S): 5000 INJECTION INTRAVENOUS; SUBCUTANEOUS at 06:00

## 2021-10-13 RX ADMIN — Medication 650 MILLIGRAM(S): at 23:40

## 2021-10-13 RX ADMIN — METHOCARBAMOL 500 MILLIGRAM(S): 500 TABLET, FILM COATED ORAL at 22:16

## 2021-10-13 RX ADMIN — HEPARIN SODIUM 5000 UNIT(S): 5000 INJECTION INTRAVENOUS; SUBCUTANEOUS at 17:00

## 2021-10-13 RX ADMIN — TRAMADOL HYDROCHLORIDE 25 MILLIGRAM(S): 50 TABLET ORAL at 22:16

## 2021-10-13 RX ADMIN — Medication 650 MILLIGRAM(S): at 23:10

## 2021-10-13 RX ADMIN — Medication 650 MILLIGRAM(S): at 17:00

## 2021-10-13 RX ADMIN — POLYETHYLENE GLYCOL 3350 17 GRAM(S): 17 POWDER, FOR SOLUTION ORAL at 17:01

## 2021-10-13 RX ADMIN — MORPHINE SULFATE 2 MILLIGRAM(S): 50 CAPSULE, EXTENDED RELEASE ORAL at 04:30

## 2021-10-13 RX ADMIN — Medication 5 MILLIGRAM(S): at 06:00

## 2021-10-13 RX ADMIN — METHOCARBAMOL 500 MILLIGRAM(S): 500 TABLET, FILM COATED ORAL at 13:58

## 2021-10-13 RX ADMIN — Medication 650 MILLIGRAM(S): at 13:15

## 2021-10-13 RX ADMIN — Medication 500 MILLIGRAM(S): at 12:01

## 2021-10-13 RX ADMIN — LIDOCAINE 2 PATCH: 4 CREAM TOPICAL at 17:41

## 2021-10-13 RX ADMIN — TRAMADOL HYDROCHLORIDE 25 MILLIGRAM(S): 50 TABLET ORAL at 16:52

## 2021-10-13 RX ADMIN — MORPHINE SULFATE 2 MILLIGRAM(S): 50 CAPSULE, EXTENDED RELEASE ORAL at 04:01

## 2021-10-13 RX ADMIN — Medication 650 MILLIGRAM(S): at 18:00

## 2021-10-13 RX ADMIN — METHOCARBAMOL 500 MILLIGRAM(S): 500 TABLET, FILM COATED ORAL at 06:00

## 2021-10-13 RX ADMIN — Medication 81 MILLIGRAM(S): at 12:01

## 2021-10-13 NOTE — PROGRESS NOTE ADULT - ASSESSMENT
89 yo female with PMH HTN and glaucoma presents to ED after syncopal episode. Cariology consulted for syncope.     Syncope  - Trops negative, unlikely ischemic event   - BNP wnl, no signs of volume overload  - Stress and carotid US <5 years ago wnl.   - No cardiac symptoms at this time.   - syncope overall seems most likely to be on the basis of dehydration and perhaps a vasovagal phenomenon  - she has a first degree avb on ekg, which is likely not material  - No prior echo.  Follow up echo  - Tele SR 80s will continue for now  - + Orthostatic BP SBP lying 129  SBP sitting 131  SBP Standing 82  - Recommend compression stockings while oob.   - Caution with anti-hypertensive medications and Narcotics for pain.      - S/P 1 Liter IVF would encourage PO fluids.      Epigastric pain  - Doesn't appear to be cardiac in nature.   - CT abd/pelvis showing "No evidence of acute visceral organ or bony injury. Intra and extrahepatic biliary dilatation and pancreatic ductal dilatation, likely related to the patient's stated age and a postcholecystectomy reservoir effect."  - Has no reports of epigastric pain today    Musculoskeletal pain/Back pain  - CT of spine - chronic changes with no acute abnormality or hemorrhagic lesion noted.   - Pain control as per primary team  - PT/OT - recommends rehab upon d/c    HTN  - bp was initially high, improved somewhat and then spiked higher again  - Continue Enalapril home dose   - Routine hemodynamic monitoring    Orthostatic Hypotension  - Would encourage PO fluids and compression stockings.   - Would do daily orthostatic BP/HR until resolved    DVT PPX  - HSQ as per primary    - Monitor Electrolytes.  Maintain K >4 and Mag >2.  Replete prn.   - All other needs as per primary team  - Further cardiac workup as indicated by clinical course.  Will follow.    Eliza Colindres [facility]  Cardiology  Spectra [phone]           89 yo female with PMH HTN and glaucoma presents to ED after syncopal episode. Cariology consulted for syncope.     Syncope  - Trops negative, unlikely ischemic event   - BNP wnl, no signs of volume overload  - Stress and carotid US <5 years ago wnl.   - No cardiac symptoms at this time.   - syncope overall seems most likely to be on the basis of dehydration and perhaps a vasovagal phenomenon  - she has a first degree avb on ekg, which is likely not material  - No prior echo.  Follow up echo  - Tele SR 80s will continue for now  - + Orthostatic BP SBP lying 129  SBP sitting 131  SBP Standing 82  - Recommend compression stockings while oob.     Epigastric pain  - Doesn't appear to be cardiac in nature.   - CT abd/pelvis showing "No evidence of acute visceral organ or bony injury. Intra and extrahepatic biliary dilatation and pancreatic ductal dilatation, likely related to the patient's stated age and a postcholecystectomy reservoir effect."  - Has no reports of epigastric pain today    Musculoskeletal pain/Back pain  - CT of spine - chronic changes with no acute abnormality or hemorrhagic lesion noted.   - Pain control as per primary team  - PT/OT - recommends rehab upon d/c    HTN  - bp was initially high, improved somewhat and then spiked higher again  - Continue Enalapril home dose   - Routine hemodynamic monitoring    Orthostatic Hypotension  - Would encourage PO fluids and compression stockings.   - Would do daily orthostatic BP/HR until resolved  - Caution with anti-hypertensive medications and Narcotics for pain.      - S/P 1 Liter IVF would encourage PO fluids.      DVT PPX  - HSQ as per primary    - Monitor Electrolytes.  Maintain K >4 and Mag >2.  Replete prn.   - All other needs as per primary team  - Further cardiac workup as indicated by clinical course.  Will follow.    Eliza Colindres [facility]  Cardiology  Spectra [phone]

## 2021-10-13 NOTE — PROGRESS NOTE ADULT - ATTENDING COMMENTS
Chart reviewed    Patient seen and examined    Agree with plan as outlined above    89 yo female with PMH HTN and glaucoma presents to ED after syncopal episode. Cariology consulted for syncope.     Syncope  - Trops negative, unlikely ischemic event   - BNP wnl, no signs of volume overload  - Stress and carotid US <5 years ago wnl.   - No cardiac symptoms at this time.   - syncope overall seems most likely to be on the basis of dehydration and perhaps a vasovagal phenomenon  - she has a first degree avb on ekg, which is likely not material  - No prior echo.  Follow up echo  - Tele SR 80s will continue for now  - + Orthostatic BP SBP lying 129  SBP sitting 131  SBP Standing 82  - Recommend compression stockings while oob.

## 2021-10-13 NOTE — CHART NOTE - NSCHARTNOTEFT_GEN_A_CORE
Called by RN for Pt c/o generalized body pain. patient seen and examined at bedside. admits to lower back pain, and hip pain. is here after a syncopal episode after having a bowel movement. patient states that morphine helped with her pain and the pain made it difficult to sleep. is unable to rate the pain but states it is very bad.        T(C): 36.8 (10-12-21 @ 16:26), Max: 36.8 (10-12-21 @ 12:15)  HR: 75 (10-12-21 @ 20:27) (70 - 98)  BP: 120/74 (10-12-21 @ 22:38) (118/75 - 200/125)  RR: 18 (10-12-21 @ 22:38) (16 - 18)  SpO2: 97% (10-12-21 @ 22:38) (95% - 98%)  Wt(kg): --    Physical :  Gen- NAD, ncat  Cardio - s+1,s+2, rrr, no murmur  Lung - cta b/l, no wheeze, no rhonchi, no rales   Abdomen- +BS, NT/ND, no guarding, no rebound, no masses  Ext- no edema, nontender to palpation of the hips bilaterally       LABS:                        14.1   13.61 )-----------( 248      ( 12 Oct 2021 11:21 )             42.7     10-12    138  |  106  |  37<H>  ----------------------------<  144<H>  4.7   |  29  |  1.10    Ca    10.1      12 Oct 2021 11:21    TPro  8.0  /  Alb  3.5  /  TBili  0.5  /  DBili  x   /  AST  40<H>  /  ALT  29  /  AlkPhos  71  10-12    PT/INR - ( 12 Oct 2021 11:21 )   PT: 11.9 sec;   INR: 1.02 ratio         PTT - ( 12 Oct 2021 11:21 )  PTT:28.0 sec      CARDIAC MARKERS ( 12 Oct 2021 17:36 )  x     / x     / x     / x     / 2.4 ng/mL  CARDIAC MARKERS ( 12 Oct 2021 17:33 )  <.015 ng/mL / x     / 83 U/L / x     / x      CARDIAC MARKERS ( 12 Oct 2021 11:21 )  <.015 ng/mL / x     / x     / x     / x            Assessment/Plan  89yFemale history of hypertension and glaucoma admitted for syncope.   - will give morphine 2 mg IV x 1   - RN to call if any changes Called by RN for Pt c/o generalized body pain. patient seen and examined at bedside. admits to lower back pain, and hip pain. is here after a syncopal episode after having a bowel movement. patient states that morphine helped with her pain and the pain made it difficult to sleep. is unable to rate the pain but states it is very bad.        T(C): 36.8 (10-12-21 @ 16:26), Max: 36.8 (10-12-21 @ 12:15)  HR: 75 (10-12-21 @ 20:27) (70 - 98)  BP: 120/74 (10-12-21 @ 22:38) (118/75 - 200/125)  RR: 18 (10-12-21 @ 22:38) (16 - 18)  SpO2: 97% (10-12-21 @ 22:38) (95% - 98%)  Wt(kg): --    Physical :  Gen- patient appears uncomfortable and in mild distress   Cardio - s+1,s+2, rrr, no murmur  Lung - cta b/l, no wheeze, no rhonchi, no rales   Abdomen- +BS, NT/ND, no guarding, no rebound, no masses  Ext- no edema, nontender to palpation of the hips bilaterally       LABS:                        14.1   13.61 )-----------( 248      ( 12 Oct 2021 11:21 )             42.7     10-12    138  |  106  |  37<H>  ----------------------------<  144<H>  4.7   |  29  |  1.10    Ca    10.1      12 Oct 2021 11:21    TPro  8.0  /  Alb  3.5  /  TBili  0.5  /  DBili  x   /  AST  40<H>  /  ALT  29  /  AlkPhos  71  10-12    PT/INR - ( 12 Oct 2021 11:21 )   PT: 11.9 sec;   INR: 1.02 ratio         PTT - ( 12 Oct 2021 11:21 )  PTT:28.0 sec      CARDIAC MARKERS ( 12 Oct 2021 17:36 )  x     / x     / x     / x     / 2.4 ng/mL  CARDIAC MARKERS ( 12 Oct 2021 17:33 )  <.015 ng/mL / x     / 83 U/L / x     / x      CARDIAC MARKERS ( 12 Oct 2021 11:21 )  <.015 ng/mL / x     / x     / x     / x            Assessment/Plan  89yFemale history of hypertension and glaucoma admitted for syncope.   - will give morphine 2 mg IV x 1   - RN to call if any changes

## 2021-10-13 NOTE — OCCUPATIONAL THERAPY INITIAL EVALUATION ADULT - ADDITIONAL COMMENTS
Pt lives in a house with her dtr Aarti with 2 steps to enter via garage, 6 steps with handrail to access bedroom. Pt has a stall shower. Pt owns a cane, raised toilet seat with arms and shower chair with back. Pt reports that she ambulates using a cane occasionally at home in the morning and outdoors. Pt does not drive. Pt reports that her dtr Aarti performs IADL's for her.

## 2021-10-13 NOTE — PHYSICAL THERAPY INITIAL EVALUATION ADULT - PERTINENT HX OF CURRENT PROBLEM, REHAB EVAL
89 y/o female with PMHx HTN and Glaucoma presents today due to syncope. pt reports she was having a bowel movement in which she next woke up on the floor. Pt reports she woke up with b/l rib/flank pain and chest pain. pt reports she did not have chest pain or any symptoms prior to waking up on floor.

## 2021-10-13 NOTE — OCCUPATIONAL THERAPY INITIAL EVALUATION ADULT - PERTINENT HX OF CURRENT PROBLEM, REHAB EVAL
89 y/o female admitted 10/12/2021 with syncope and collapse s/p fall at home after going to the bathroom. BP 82/52 upon standing with c/o back pain using RW for support. Patient assisted back to supine in bed; bp 117/70. Layne BURNS made aware of patient status.

## 2021-10-14 DIAGNOSIS — I10 ESSENTIAL (PRIMARY) HYPERTENSION: ICD-10-CM

## 2021-10-14 DIAGNOSIS — M54.9 DORSALGIA, UNSPECIFIED: ICD-10-CM

## 2021-10-14 DIAGNOSIS — R55 SYNCOPE AND COLLAPSE: ICD-10-CM

## 2021-10-14 LAB
ANION GAP SERPL CALC-SCNC: 6 MMOL/L — SIGNIFICANT CHANGE UP (ref 5–17)
BUN SERPL-MCNC: 29 MG/DL — HIGH (ref 7–23)
CALCIUM SERPL-MCNC: 9 MG/DL — SIGNIFICANT CHANGE UP (ref 8.5–10.1)
CHLORIDE SERPL-SCNC: 109 MMOL/L — HIGH (ref 96–108)
CO2 SERPL-SCNC: 25 MMOL/L — SIGNIFICANT CHANGE UP (ref 22–31)
COVID-19 SPIKE DOMAIN AB INTERP: POSITIVE
COVID-19 SPIKE DOMAIN ANTIBODY RESULT: >250 U/ML — HIGH
CREAT SERPL-MCNC: 0.89 MG/DL — SIGNIFICANT CHANGE UP (ref 0.5–1.3)
GLUCOSE SERPL-MCNC: 99 MG/DL — SIGNIFICANT CHANGE UP (ref 70–99)
HCT VFR BLD CALC: 40.3 % — SIGNIFICANT CHANGE UP (ref 34.5–45)
HGB BLD-MCNC: 13.6 G/DL — SIGNIFICANT CHANGE UP (ref 11.5–15.5)
MCHC RBC-ENTMCNC: 31.2 PG — SIGNIFICANT CHANGE UP (ref 27–34)
MCHC RBC-ENTMCNC: 33.7 GM/DL — SIGNIFICANT CHANGE UP (ref 32–36)
MCV RBC AUTO: 92.4 FL — SIGNIFICANT CHANGE UP (ref 80–100)
NRBC # BLD: 0 /100 WBCS — SIGNIFICANT CHANGE UP (ref 0–0)
PLATELET # BLD AUTO: 228 K/UL — SIGNIFICANT CHANGE UP (ref 150–400)
POTASSIUM SERPL-MCNC: 3.7 MMOL/L — SIGNIFICANT CHANGE UP (ref 3.5–5.3)
POTASSIUM SERPL-SCNC: 3.7 MMOL/L — SIGNIFICANT CHANGE UP (ref 3.5–5.3)
RBC # BLD: 4.36 M/UL — SIGNIFICANT CHANGE UP (ref 3.8–5.2)
RBC # FLD: 13.2 % — SIGNIFICANT CHANGE UP (ref 10.3–14.5)
SARS-COV-2 IGG+IGM SERPL QL IA: >250 U/ML — HIGH
SARS-COV-2 IGG+IGM SERPL QL IA: POSITIVE
SODIUM SERPL-SCNC: 140 MMOL/L — SIGNIFICANT CHANGE UP (ref 135–145)
WBC # BLD: 10.39 K/UL — SIGNIFICANT CHANGE UP (ref 3.8–10.5)
WBC # FLD AUTO: 10.39 K/UL — SIGNIFICANT CHANGE UP (ref 3.8–10.5)

## 2021-10-14 PROCEDURE — 99232 SBSQ HOSP IP/OBS MODERATE 35: CPT

## 2021-10-14 PROCEDURE — 99497 ADVNCD CARE PLAN 30 MIN: CPT

## 2021-10-14 RX ORDER — HEPARIN SODIUM 5000 [USP'U]/ML
5000 INJECTION INTRAVENOUS; SUBCUTANEOUS EVERY 8 HOURS
Refills: 0 | Status: DISCONTINUED | OUTPATIENT
Start: 2021-10-14 | End: 2021-10-19

## 2021-10-14 RX ADMIN — LIDOCAINE 2 PATCH: 4 CREAM TOPICAL at 05:11

## 2021-10-14 RX ADMIN — Medication 650 MILLIGRAM(S): at 18:42

## 2021-10-14 RX ADMIN — Medication 650 MILLIGRAM(S): at 23:25

## 2021-10-14 RX ADMIN — METHOCARBAMOL 500 MILLIGRAM(S): 500 TABLET, FILM COATED ORAL at 21:40

## 2021-10-14 RX ADMIN — LIDOCAINE 2 PATCH: 4 CREAM TOPICAL at 19:30

## 2021-10-14 RX ADMIN — TRAMADOL HYDROCHLORIDE 25 MILLIGRAM(S): 50 TABLET ORAL at 06:10

## 2021-10-14 RX ADMIN — Medication 650 MILLIGRAM(S): at 11:00

## 2021-10-14 RX ADMIN — HEPARIN SODIUM 5000 UNIT(S): 5000 INJECTION INTRAVENOUS; SUBCUTANEOUS at 13:44

## 2021-10-14 RX ADMIN — POLYETHYLENE GLYCOL 3350 17 GRAM(S): 17 POWDER, FOR SOLUTION ORAL at 17:57

## 2021-10-14 RX ADMIN — TRAMADOL HYDROCHLORIDE 25 MILLIGRAM(S): 50 TABLET ORAL at 18:20

## 2021-10-14 RX ADMIN — HEPARIN SODIUM 5000 UNIT(S): 5000 INJECTION INTRAVENOUS; SUBCUTANEOUS at 06:11

## 2021-10-14 RX ADMIN — POLYETHYLENE GLYCOL 3350 17 GRAM(S): 17 POWDER, FOR SOLUTION ORAL at 06:10

## 2021-10-14 RX ADMIN — Medication 81 MILLIGRAM(S): at 11:00

## 2021-10-14 RX ADMIN — TRAMADOL HYDROCHLORIDE 25 MILLIGRAM(S): 50 TABLET ORAL at 11:51

## 2021-10-14 RX ADMIN — METHOCARBAMOL 500 MILLIGRAM(S): 500 TABLET, FILM COATED ORAL at 06:10

## 2021-10-14 RX ADMIN — Medication 500 MILLIGRAM(S): at 11:00

## 2021-10-14 RX ADMIN — LIDOCAINE 2 PATCH: 4 CREAM TOPICAL at 23:27

## 2021-10-14 RX ADMIN — Medication 650 MILLIGRAM(S): at 17:57

## 2021-10-14 RX ADMIN — LIDOCAINE 2 PATCH: 4 CREAM TOPICAL at 11:00

## 2021-10-14 RX ADMIN — Medication 650 MILLIGRAM(S): at 11:50

## 2021-10-14 RX ADMIN — TRAMADOL HYDROCHLORIDE 25 MILLIGRAM(S): 50 TABLET ORAL at 11:00

## 2021-10-14 RX ADMIN — TRAMADOL HYDROCHLORIDE 25 MILLIGRAM(S): 50 TABLET ORAL at 17:57

## 2021-10-14 RX ADMIN — METHOCARBAMOL 500 MILLIGRAM(S): 500 TABLET, FILM COATED ORAL at 13:44

## 2021-10-14 RX ADMIN — Medication 10 MILLIGRAM(S): at 10:36

## 2021-10-14 RX ADMIN — Medication 1000 UNIT(S): at 11:00

## 2021-10-14 RX ADMIN — Medication 650 MILLIGRAM(S): at 06:11

## 2021-10-14 RX ADMIN — Medication 5 MILLIGRAM(S): at 06:10

## 2021-10-14 RX ADMIN — HEPARIN SODIUM 5000 UNIT(S): 5000 INJECTION INTRAVENOUS; SUBCUTANEOUS at 21:40

## 2021-10-14 NOTE — PROGRESS NOTE ADULT - ASSESSMENT
89 yr old female admitted with syncopal episode  suspect vasovagal with element of dehydration.  states that musculoskeletal pain is limiting her ability to ambulate    pain control- will start atc tylenol, lidoerm patch, and prn tramadol    PT follow up- may need DANIEL    syncope - as above, suspect vasovagal.  follow up echo

## 2021-10-14 NOTE — GOALS OF CARE CONVERSATION - ADVANCED CARE PLANNING - CONVERSATION DETAILS
Writer met with.pt Reviewed patient's medical and social history as well as events leading to patient's hospitalization. Writer discussed patient's current diagnosis syncope, recent fall , advanced age, debility , HTN,  medical condition and management,  .... prognosis, and life expectancy. Inquired about patient's wishes regarding extent of medical care to be provided including escalation of medical care into the ICU, use of vasopressor support and intubation with vent support. In addition, the writer inquired about thoughts regarding cardiopulmonary resuscitation, artificial nutrition and hydration including use of feeding tubes and IVF, antibiotics, and further investigative studies such as blood draws and radiology .pt  showed good  insight into medical condition. All questions answered. Writer recommended completion of advance directives  Patient consented to DNR/DNI  status. MOLST form filled out and placed in chart. Writer met with.pt Reviewed patient's medical and social history as well as events leading to patient's hospitalization. Writer discussed patient's current diagnosis syncope, recent fall , advanced age, debility , HTN,  medical condition and management, prognosis, and life expectancy. Inquired about patient's wishes regarding extent of medical care to be provided including escalation of medical care into the ICU, use of vasopressor support and intubation with vent support. In addition, the writer inquired about thoughts regarding cardiopulmonary resuscitation, artificial nutrition and hydration including use of feeding tubes and IVF, antibiotics, and further investigative studies such as blood draws and radiology .pt  showed good  insight into medical condition. All questions answered. Writer recommended completion of advance directives  Patient consented to DNR/DNI  status. MOLST form filled out and placed in chart.

## 2021-10-14 NOTE — PROGRESS NOTE ADULT - ATTENDING COMMENTS
No evidence of any cardiac arrhythmia. Encourage PO.  Continue present medications.  To follow closely.  No cardiac objection to d/c

## 2021-10-14 NOTE — PROGRESS NOTE ADULT - ASSESSMENT
89 yo female with PMH HTN and glaucoma presents to ED after syncopal episode. Cariology consulted for syncope.     Syncope  - Trops negative, unlikely ischemic event   - BNP wnl, no signs of volume overload  - Stress and carotid US <5 years ago wnl.   - No cardiac symptoms at this time.   - Syncope overall seems most likely to be on the basis of dehydration and perhaps a vasovagal phenomenon  - Orthostatics positive.  Compression stockings  - EKG showed NSR with 1st degree AVB, unremarkable for etiology for her syncope  - No prior echo.  Follow up echo official report  - Tele showed NSR with no arrhythmias.  Can discontinue    Epigastric pain/Back pain  - Doesn't appear to be cardiac in nature.   - CT abd/pelvis showing "No evidence of acute visceral organ or bony injury. Intra and extrahepatic biliary dilatation and pancreatic ductal dilatation, likely related to the patient's stated age and a postcholecystectomy reservoir effect."  - Pain control per Primary    HTN  - Mostly controlled with an isolated elevation at 170 systolic this am, likely, reactive to pain  - Continue Enalapril home dose.  Would tolerate some degree of supine hypertension  - Routine hemodynamic monitoring    Orthostatic Hypotension  - Encourage PO fluids and compression stockings.   - Euvolemic.  Okay with IV  hydration for now  - Continue daily orthostatic readings  - Caution with anti-hypertensive medications and Narcotics for pain.      - S/P 1 Liter IVF would encourage PO fluids.      DVT PPX  - Per primary    - Monitor Electrolytes.  Maintain K >4 and Mag >2.   - All other needs as per primary team  - Further cardiac workup as indicated by clinical course.  Will follow.    Jacqueline Hdz DNP, NP-C  Cardiology   Spectra #[phone]

## 2021-10-15 PROCEDURE — 99232 SBSQ HOSP IP/OBS MODERATE 35: CPT

## 2021-10-15 RX ORDER — METHOCARBAMOL 500 MG/1
1 TABLET, FILM COATED ORAL
Qty: 0 | Refills: 0 | DISCHARGE
Start: 2021-10-15

## 2021-10-15 RX ORDER — HYDRALAZINE HCL 50 MG
10 TABLET ORAL ONCE
Refills: 0 | Status: COMPLETED | OUTPATIENT
Start: 2021-10-15 | End: 2021-10-15

## 2021-10-15 RX ORDER — OXYCODONE HYDROCHLORIDE 5 MG/1
5 TABLET ORAL EVERY 4 HOURS
Refills: 0 | Status: DISCONTINUED | OUTPATIENT
Start: 2021-10-15 | End: 2021-10-19

## 2021-10-15 RX ORDER — GABAPENTIN 400 MG/1
1 CAPSULE ORAL
Qty: 0 | Refills: 0 | DISCHARGE
Start: 2021-10-15

## 2021-10-15 RX ORDER — ACETAMINOPHEN 500 MG
2 TABLET ORAL
Qty: 0 | Refills: 0 | DISCHARGE
Start: 2021-10-15

## 2021-10-15 RX ORDER — TRAMADOL HYDROCHLORIDE 50 MG/1
0.5 TABLET ORAL
Qty: 0 | Refills: 0 | DISCHARGE
Start: 2021-10-15

## 2021-10-15 RX ORDER — POLYETHYLENE GLYCOL 3350 17 G/17G
17 POWDER, FOR SOLUTION ORAL
Qty: 0 | Refills: 0 | DISCHARGE
Start: 2021-10-15

## 2021-10-15 RX ORDER — HEPARIN SODIUM 5000 [USP'U]/ML
0 INJECTION INTRAVENOUS; SUBCUTANEOUS
Qty: 0 | Refills: 0 | DISCHARGE
Start: 2021-10-15

## 2021-10-15 RX ORDER — OXYCODONE HYDROCHLORIDE 5 MG/1
1 TABLET ORAL
Qty: 0 | Refills: 0 | DISCHARGE
Start: 2021-10-15

## 2021-10-15 RX ORDER — GABAPENTIN 400 MG/1
2 CAPSULE ORAL
Qty: 0 | Refills: 0 | DISCHARGE
Start: 2021-10-15

## 2021-10-15 RX ORDER — GABAPENTIN 400 MG/1
100 CAPSULE ORAL THREE TIMES A DAY
Refills: 0 | Status: DISCONTINUED | OUTPATIENT
Start: 2021-10-15 | End: 2021-10-18

## 2021-10-15 RX ADMIN — POLYETHYLENE GLYCOL 3350 17 GRAM(S): 17 POWDER, FOR SOLUTION ORAL at 05:00

## 2021-10-15 RX ADMIN — POLYETHYLENE GLYCOL 3350 17 GRAM(S): 17 POWDER, FOR SOLUTION ORAL at 17:35

## 2021-10-15 RX ADMIN — TRAMADOL HYDROCHLORIDE 25 MILLIGRAM(S): 50 TABLET ORAL at 10:00

## 2021-10-15 RX ADMIN — METHOCARBAMOL 500 MILLIGRAM(S): 500 TABLET, FILM COATED ORAL at 22:00

## 2021-10-15 RX ADMIN — GABAPENTIN 100 MILLIGRAM(S): 400 CAPSULE ORAL at 22:00

## 2021-10-15 RX ADMIN — Medication 650 MILLIGRAM(S): at 11:42

## 2021-10-15 RX ADMIN — HEPARIN SODIUM 5000 UNIT(S): 5000 INJECTION INTRAVENOUS; SUBCUTANEOUS at 22:00

## 2021-10-15 RX ADMIN — Medication 10 MILLIGRAM(S): at 04:56

## 2021-10-15 RX ADMIN — HEPARIN SODIUM 5000 UNIT(S): 5000 INJECTION INTRAVENOUS; SUBCUTANEOUS at 05:01

## 2021-10-15 RX ADMIN — Medication 10 MILLIGRAM(S): at 11:42

## 2021-10-15 RX ADMIN — Medication 1000 UNIT(S): at 11:42

## 2021-10-15 RX ADMIN — Medication 10 MILLIGRAM(S): at 06:40

## 2021-10-15 RX ADMIN — OXYCODONE HYDROCHLORIDE 5 MILLIGRAM(S): 5 TABLET ORAL at 15:43

## 2021-10-15 RX ADMIN — Medication 500 MILLIGRAM(S): at 11:42

## 2021-10-15 RX ADMIN — LIDOCAINE 2 PATCH: 4 CREAM TOPICAL at 22:01

## 2021-10-15 RX ADMIN — Medication 650 MILLIGRAM(S): at 17:48

## 2021-10-15 RX ADMIN — LIDOCAINE 2 PATCH: 4 CREAM TOPICAL at 11:42

## 2021-10-15 RX ADMIN — METHOCARBAMOL 500 MILLIGRAM(S): 500 TABLET, FILM COATED ORAL at 14:03

## 2021-10-15 RX ADMIN — Medication 650 MILLIGRAM(S): at 05:00

## 2021-10-15 RX ADMIN — GABAPENTIN 100 MILLIGRAM(S): 400 CAPSULE ORAL at 14:03

## 2021-10-15 RX ADMIN — Medication 650 MILLIGRAM(S): at 12:19

## 2021-10-15 RX ADMIN — Medication 650 MILLIGRAM(S): at 06:51

## 2021-10-15 RX ADMIN — HEPARIN SODIUM 5000 UNIT(S): 5000 INJECTION INTRAVENOUS; SUBCUTANEOUS at 14:03

## 2021-10-15 RX ADMIN — Medication 650 MILLIGRAM(S): at 00:29

## 2021-10-15 RX ADMIN — METHOCARBAMOL 500 MILLIGRAM(S): 500 TABLET, FILM COATED ORAL at 05:00

## 2021-10-15 RX ADMIN — Medication 650 MILLIGRAM(S): at 17:35

## 2021-10-15 RX ADMIN — OXYCODONE HYDROCHLORIDE 5 MILLIGRAM(S): 5 TABLET ORAL at 14:03

## 2021-10-15 RX ADMIN — TRAMADOL HYDROCHLORIDE 25 MILLIGRAM(S): 50 TABLET ORAL at 09:58

## 2021-10-15 NOTE — PROGRESS NOTE ADULT - ASSESSMENT
89 yo female with PMH HTN and glaucoma presents to ED after syncopal episode. Cariology consulted for syncope.     Syncope  - Trops negative, unlikely ischemic event   - BNP wnl, no signs of volume overload  - Stress and carotid US <5 years ago wnl.   - Syncope overall seems most likely to be on the basis of dehydration and perhaps a vasovagal phenomenon  - was Orthostatics positive sp ivf ,   Compression stockings  - EKG showed NSR with 1st degree AVB, unremarkable for etiology for her syncope  - No prior echo.  Follow up echo official report  - Tele showed NSR with no arrhythmias.  Can discontinue tele       HTN  - 142/61  continue enalapril       - Monitor Electrolytes.  Maintain K >4 and Mag >2.   dc planning in place   Flora Ashton FNP-C  Cardiology NP  SPECTRA 3959 [phone]     89 yo female with PMH HTN and glaucoma presents to ED after syncopal episode. Cariology consulted for syncope.     Syncope  - Trops negative, unlikely ischemic event   - BNP wnl, no signs of volume overload  - Stress and carotid US <5 years ago wnl.   - Syncope overall seems most likely to be on the basis of dehydration and perhaps a vasovagal phenomenon  - was Orthostatics positive sp ivf ,   Compression stockings  - EKG showed NSR with 1st degree AVB, unremarkable for etiology for her syncope  - No prior echo.  Follow up echo official report  - Tele showed NSR with no arrhythmias.  Can discontinue tele       HTN  - 142/61  continue enalapril as tolerated       - Monitor Electrolytes.  Maintain K >4 and Mag >2.   dc planning in place   Flora Ashton FNP-C  Cardiology NP  SPECTRA 3959 [phone]

## 2021-10-15 NOTE — DISCHARGE NOTE PROVIDER - CARE PROVIDERS DIRECT ADDRESSES
,DirectAddress_Unknown,[email or web address]

## 2021-10-15 NOTE — DISCHARGE NOTE PROVIDER - NSDCMRMEDTOKEN_GEN_ALL_CORE_FT
acetaminophen 325 mg oral tablet: 2 tab(s) orally every 6 hours  Aspir 81 oral delayed release tablet: 1 tab(s) orally once a day  Complex-15: Apply topically to affected area once a day  enalapril 20 mg oral tablet: 1 tab(s) orally every 24 hours  gabapentin 100 mg oral capsule: 1 cap(s) orally 3 times a day  heparin 5000 units/mL injectable solution:  injectable every 12 hours  lidocaine 5% patch: Apply topically to affected area once a day, As Needed  methocarbamol 500 mg oral tablet: 1 tab(s) orally 3 times a day  oxyCODONE 5 mg oral tablet: 1 tab(s) orally every 4 hours, As needed, Severe Pain (7 - 10)  polyethylene glycol 3350 oral powder for reconstitution: 17 gram(s) orally 2 times a day  traMADol 50 mg oral tablet: 0.5 tab(s) orally every 4 hours, As needed, Moderate Pain (4 - 6)  Vitamin C: 1 tab(s) orally once a day  Vitamin D3 25 mcg (1000 intl units) oral tablet: 1 tab(s) orally once a day   acetaminophen 325 mg oral tablet: 2 tab(s) orally every 6 hours  amLODIPine 10 mg oral tablet: 1 tab(s) orally once a day  Aspir 81 oral delayed release tablet: 1 tab(s) orally once a day  bisacodyl 5 mg oral delayed release tablet: 1 tab(s) orally every 12 hours, As needed, Constipation  Complex-15: Apply topically to affected area once a day  gabapentin 100 mg oral capsule: 2 cap(s) orally 3 times a day  heparin 5000 units/mL injectable solution:  injectable every 12 hours  lidocaine 5% patch: Apply topically to affected area once a day, As Needed  methocarbamol 500 mg oral tablet: 1 tab(s) orally 3 times a day  oxyCODONE 5 mg oral tablet: 1 tab(s) orally every 4 hours, As needed, Severe Pain (7 - 10)  polyethylene glycol 3350 oral powder for reconstitution: 17 gram(s) orally 2 times a day  psyllium 3.4 g/7 g oral powder for reconstitution: orally once a day  senna oral tablet: 2 tab(s) orally once a day (at bedtime)  traMADol 50 mg oral tablet: 0.5 tab(s) orally every 4 hours, As needed, Moderate Pain (4 - 6)  Vitamin C: 1 tab(s) orally once a day  Vitamin D3 25 mcg (1000 intl units) oral tablet: 1 tab(s) orally once a day

## 2021-10-15 NOTE — DISCHARGE NOTE PROVIDER - NSDCCPCAREPLAN_GEN_ALL_CORE_FT
PRINCIPAL DISCHARGE DIAGNOSIS  Diagnosis: Syncope  Assessment and Plan of Treatment: follow up with rehab MD      
no

## 2021-10-15 NOTE — PROGRESS NOTE ADULT - ASSESSMENT
89 yr old female admitted with syncopal episode  suspect vasovagal with element of dehydration.  states that musculoskeletal pain is limiting her ability to ambulate    pain control- will start atc tylenol, lidoerm patch, and prn tramadol    PT follow up- may need DANIEL  add neurontin for pain  add robaxin for spasm    syncope - as above, suspect vasovagal.  follow up echo

## 2021-10-15 NOTE — PROGRESS NOTE ADULT - ATTENDING COMMENTS
No signs of significant ischemia or volume overload. cont current care. Further cardiac workup will depend on clinical course.

## 2021-10-15 NOTE — CHART NOTE - NSCHARTNOTEFT_GEN_A_CORE
called by rn patient manual /89. on vasotec at home. Patient asymptomatic. will give enalipril 10 mg X 1 PO.

## 2021-10-15 NOTE — DISCHARGE NOTE PROVIDER - HOSPITAL COURSE
admitted for syncope  neurocardiac work up negative  HTN - labile added norvasc  pain control with narcotics  DC after cardio and PT clearance

## 2021-10-15 NOTE — DISCHARGE NOTE PROVIDER - CARE PROVIDER_API CALL
Ponce White (DO)  Internal Medicine  [address] [place] [address]  Phone: [phone]  Fax: [phone]  Follow Up Time:     Randall Almeida)  Internal Medicine  [address] [place], [place] [id number]  Phone: [phone]  Fax: [phone]  Follow Up Time:

## 2021-10-16 PROCEDURE — 99232 SBSQ HOSP IP/OBS MODERATE 35: CPT

## 2021-10-16 RX ADMIN — Medication 650 MILLIGRAM(S): at 07:26

## 2021-10-16 RX ADMIN — TRAMADOL HYDROCHLORIDE 25 MILLIGRAM(S): 50 TABLET ORAL at 19:36

## 2021-10-16 RX ADMIN — Medication 1000 UNIT(S): at 11:48

## 2021-10-16 RX ADMIN — TRAMADOL HYDROCHLORIDE 25 MILLIGRAM(S): 50 TABLET ORAL at 18:39

## 2021-10-16 RX ADMIN — GABAPENTIN 100 MILLIGRAM(S): 400 CAPSULE ORAL at 04:49

## 2021-10-16 RX ADMIN — Medication 650 MILLIGRAM(S): at 05:02

## 2021-10-16 RX ADMIN — LIDOCAINE 2 PATCH: 4 CREAM TOPICAL at 07:26

## 2021-10-16 RX ADMIN — OXYCODONE HYDROCHLORIDE 5 MILLIGRAM(S): 5 TABLET ORAL at 13:38

## 2021-10-16 RX ADMIN — OXYCODONE HYDROCHLORIDE 5 MILLIGRAM(S): 5 TABLET ORAL at 04:47

## 2021-10-16 RX ADMIN — Medication 650 MILLIGRAM(S): at 12:39

## 2021-10-16 RX ADMIN — HEPARIN SODIUM 5000 UNIT(S): 5000 INJECTION INTRAVENOUS; SUBCUTANEOUS at 05:02

## 2021-10-16 RX ADMIN — GABAPENTIN 100 MILLIGRAM(S): 400 CAPSULE ORAL at 21:40

## 2021-10-16 RX ADMIN — GABAPENTIN 100 MILLIGRAM(S): 400 CAPSULE ORAL at 14:23

## 2021-10-16 RX ADMIN — LIDOCAINE 2 PATCH: 4 CREAM TOPICAL at 14:25

## 2021-10-16 RX ADMIN — Medication 500 MILLIGRAM(S): at 11:48

## 2021-10-16 RX ADMIN — LIDOCAINE 2 PATCH: 4 CREAM TOPICAL at 19:41

## 2021-10-16 RX ADMIN — OXYCODONE HYDROCHLORIDE 5 MILLIGRAM(S): 5 TABLET ORAL at 09:34

## 2021-10-16 RX ADMIN — HEPARIN SODIUM 5000 UNIT(S): 5000 INJECTION INTRAVENOUS; SUBCUTANEOUS at 21:40

## 2021-10-16 RX ADMIN — METHOCARBAMOL 500 MILLIGRAM(S): 500 TABLET, FILM COATED ORAL at 21:40

## 2021-10-16 RX ADMIN — METHOCARBAMOL 500 MILLIGRAM(S): 500 TABLET, FILM COATED ORAL at 05:02

## 2021-10-16 RX ADMIN — Medication 650 MILLIGRAM(S): at 11:48

## 2021-10-16 RX ADMIN — POLYETHYLENE GLYCOL 3350 17 GRAM(S): 17 POWDER, FOR SOLUTION ORAL at 05:02

## 2021-10-16 RX ADMIN — POLYETHYLENE GLYCOL 3350 17 GRAM(S): 17 POWDER, FOR SOLUTION ORAL at 17:11

## 2021-10-16 RX ADMIN — Medication 650 MILLIGRAM(S): at 17:26

## 2021-10-16 RX ADMIN — METHOCARBAMOL 500 MILLIGRAM(S): 500 TABLET, FILM COATED ORAL at 14:23

## 2021-10-16 RX ADMIN — Medication 20 MILLIGRAM(S): at 05:02

## 2021-10-16 RX ADMIN — HEPARIN SODIUM 5000 UNIT(S): 5000 INJECTION INTRAVENOUS; SUBCUTANEOUS at 14:23

## 2021-10-16 RX ADMIN — Medication 650 MILLIGRAM(S): at 17:11

## 2021-10-16 RX ADMIN — OXYCODONE HYDROCHLORIDE 5 MILLIGRAM(S): 5 TABLET ORAL at 06:00

## 2021-10-16 NOTE — PROGRESS NOTE ADULT - ATTENDING COMMENTS
89 yo female with PMH HTN and glaucoma presents to ED after syncopal episode.     Syncope  - Syncope overall seems most likely to be on the basis of dehydration and perhaps a vasovagal phenomenon  -TTE nL LV & RV size and function no significant valvular dysfunction EF 65%    HTN  continue enalapril as tolerated

## 2021-10-16 NOTE — PROGRESS NOTE ADULT - ASSESSMENT
89 yo female with PMH HTN and glaucoma presents to ED after syncopal episode. Cariology consulted for syncope.     Syncope  - Trops negative, unlikely ischemic event   - BNP wnl, no signs of volume overload  - Stress and carotid US <5 years ago wnl.   - Syncope overall seems most likely to be on the basis of dehydration and perhaps a vasovagal phenomenon  - was Orthostatics positive sp ivf ,   Compression stockings  - EKG showed NSR with 1st degree AVB, unremarkable for etiology for her syncope  -TTE nL LV & RV size and function no significant valvular dysfunction EF 65%        HTN  - BP: 130/86 (10-16-21 @ 11:39) (105/53 - 140/78)  continue enalapril as tolerated       - Monitor Electrolytes.  Maintain K >4 and Mag >2.   dc planning in place     Elijah Mcgill DNP, ANP-c  Cardiology   Spectra #[phone]

## 2021-10-17 RX ADMIN — GABAPENTIN 100 MILLIGRAM(S): 400 CAPSULE ORAL at 05:12

## 2021-10-17 RX ADMIN — Medication 650 MILLIGRAM(S): at 05:12

## 2021-10-17 RX ADMIN — OXYCODONE HYDROCHLORIDE 5 MILLIGRAM(S): 5 TABLET ORAL at 00:36

## 2021-10-17 RX ADMIN — METHOCARBAMOL 500 MILLIGRAM(S): 500 TABLET, FILM COATED ORAL at 14:00

## 2021-10-17 RX ADMIN — Medication 500 MILLIGRAM(S): at 11:29

## 2021-10-17 RX ADMIN — LIDOCAINE 2 PATCH: 4 CREAM TOPICAL at 01:00

## 2021-10-17 RX ADMIN — HEPARIN SODIUM 5000 UNIT(S): 5000 INJECTION INTRAVENOUS; SUBCUTANEOUS at 05:12

## 2021-10-17 RX ADMIN — TRAMADOL HYDROCHLORIDE 25 MILLIGRAM(S): 50 TABLET ORAL at 11:12

## 2021-10-17 RX ADMIN — Medication 1000 UNIT(S): at 11:29

## 2021-10-17 RX ADMIN — TRAMADOL HYDROCHLORIDE 25 MILLIGRAM(S): 50 TABLET ORAL at 10:12

## 2021-10-17 RX ADMIN — LIDOCAINE 2 PATCH: 4 CREAM TOPICAL at 11:29

## 2021-10-17 RX ADMIN — Medication 650 MILLIGRAM(S): at 12:29

## 2021-10-17 RX ADMIN — OXYCODONE HYDROCHLORIDE 5 MILLIGRAM(S): 5 TABLET ORAL at 01:10

## 2021-10-17 RX ADMIN — LIDOCAINE 2 PATCH: 4 CREAM TOPICAL at 19:50

## 2021-10-17 RX ADMIN — HEPARIN SODIUM 5000 UNIT(S): 5000 INJECTION INTRAVENOUS; SUBCUTANEOUS at 14:01

## 2021-10-17 RX ADMIN — Medication 20 MILLIGRAM(S): at 05:13

## 2021-10-17 RX ADMIN — GABAPENTIN 100 MILLIGRAM(S): 400 CAPSULE ORAL at 21:13

## 2021-10-17 RX ADMIN — Medication 650 MILLIGRAM(S): at 17:05

## 2021-10-17 RX ADMIN — GABAPENTIN 100 MILLIGRAM(S): 400 CAPSULE ORAL at 14:00

## 2021-10-17 RX ADMIN — METHOCARBAMOL 500 MILLIGRAM(S): 500 TABLET, FILM COATED ORAL at 21:13

## 2021-10-17 RX ADMIN — Medication 650 MILLIGRAM(S): at 06:00

## 2021-10-17 RX ADMIN — POLYETHYLENE GLYCOL 3350 17 GRAM(S): 17 POWDER, FOR SOLUTION ORAL at 17:05

## 2021-10-17 RX ADMIN — HEPARIN SODIUM 5000 UNIT(S): 5000 INJECTION INTRAVENOUS; SUBCUTANEOUS at 21:13

## 2021-10-17 RX ADMIN — METHOCARBAMOL 500 MILLIGRAM(S): 500 TABLET, FILM COATED ORAL at 05:12

## 2021-10-17 RX ADMIN — Medication 650 MILLIGRAM(S): at 11:29

## 2021-10-18 LAB — SARS-COV-2 RNA SPEC QL NAA+PROBE: SIGNIFICANT CHANGE UP

## 2021-10-18 PROCEDURE — 72100 X-RAY EXAM L-S SPINE 2/3 VWS: CPT | Mod: 26

## 2021-10-18 PROCEDURE — 99232 SBSQ HOSP IP/OBS MODERATE 35: CPT

## 2021-10-18 RX ORDER — PSYLLIUM SEED (WITH DEXTROSE)
1 POWDER (GRAM) ORAL DAILY
Refills: 0 | Status: DISCONTINUED | OUTPATIENT
Start: 2021-10-18 | End: 2021-10-19

## 2021-10-18 RX ORDER — SENNA PLUS 8.6 MG/1
2 TABLET ORAL AT BEDTIME
Refills: 0 | Status: DISCONTINUED | OUTPATIENT
Start: 2021-10-18 | End: 2021-10-19

## 2021-10-18 RX ORDER — GABAPENTIN 400 MG/1
300 CAPSULE ORAL THREE TIMES A DAY
Refills: 0 | Status: DISCONTINUED | OUTPATIENT
Start: 2021-10-18 | End: 2021-10-19

## 2021-10-18 RX ADMIN — HEPARIN SODIUM 5000 UNIT(S): 5000 INJECTION INTRAVENOUS; SUBCUTANEOUS at 13:58

## 2021-10-18 RX ADMIN — LIDOCAINE 2 PATCH: 4 CREAM TOPICAL at 12:34

## 2021-10-18 RX ADMIN — Medication 650 MILLIGRAM(S): at 13:34

## 2021-10-18 RX ADMIN — METHOCARBAMOL 500 MILLIGRAM(S): 500 TABLET, FILM COATED ORAL at 21:14

## 2021-10-18 RX ADMIN — Medication 650 MILLIGRAM(S): at 12:34

## 2021-10-18 RX ADMIN — OXYCODONE HYDROCHLORIDE 5 MILLIGRAM(S): 5 TABLET ORAL at 01:45

## 2021-10-18 RX ADMIN — SENNA PLUS 2 TABLET(S): 8.6 TABLET ORAL at 21:14

## 2021-10-18 RX ADMIN — OXYCODONE HYDROCHLORIDE 5 MILLIGRAM(S): 5 TABLET ORAL at 21:14

## 2021-10-18 RX ADMIN — HEPARIN SODIUM 5000 UNIT(S): 5000 INJECTION INTRAVENOUS; SUBCUTANEOUS at 21:14

## 2021-10-18 RX ADMIN — METHOCARBAMOL 500 MILLIGRAM(S): 500 TABLET, FILM COATED ORAL at 13:58

## 2021-10-18 RX ADMIN — Medication 500 MILLIGRAM(S): at 12:35

## 2021-10-18 RX ADMIN — Medication 650 MILLIGRAM(S): at 23:26

## 2021-10-18 RX ADMIN — Medication 650 MILLIGRAM(S): at 05:40

## 2021-10-18 RX ADMIN — LIDOCAINE 2 PATCH: 4 CREAM TOPICAL at 01:00

## 2021-10-18 RX ADMIN — OXYCODONE HYDROCHLORIDE 5 MILLIGRAM(S): 5 TABLET ORAL at 22:15

## 2021-10-18 RX ADMIN — Medication 1000 UNIT(S): at 12:35

## 2021-10-18 RX ADMIN — HEPARIN SODIUM 5000 UNIT(S): 5000 INJECTION INTRAVENOUS; SUBCUTANEOUS at 05:08

## 2021-10-18 RX ADMIN — LIDOCAINE 2 PATCH: 4 CREAM TOPICAL at 21:05

## 2021-10-18 RX ADMIN — POLYETHYLENE GLYCOL 3350 17 GRAM(S): 17 POWDER, FOR SOLUTION ORAL at 17:10

## 2021-10-18 RX ADMIN — Medication 20 MILLIGRAM(S): at 05:08

## 2021-10-18 RX ADMIN — OXYCODONE HYDROCHLORIDE 5 MILLIGRAM(S): 5 TABLET ORAL at 16:11

## 2021-10-18 RX ADMIN — GABAPENTIN 300 MILLIGRAM(S): 400 CAPSULE ORAL at 13:58

## 2021-10-18 RX ADMIN — OXYCODONE HYDROCHLORIDE 5 MILLIGRAM(S): 5 TABLET ORAL at 02:15

## 2021-10-18 RX ADMIN — GABAPENTIN 300 MILLIGRAM(S): 400 CAPSULE ORAL at 21:14

## 2021-10-18 RX ADMIN — Medication 650 MILLIGRAM(S): at 05:08

## 2021-10-18 RX ADMIN — Medication 650 MILLIGRAM(S): at 18:07

## 2021-10-18 RX ADMIN — METHOCARBAMOL 500 MILLIGRAM(S): 500 TABLET, FILM COATED ORAL at 05:08

## 2021-10-18 RX ADMIN — Medication 1 ENEMA: at 19:25

## 2021-10-18 RX ADMIN — GABAPENTIN 100 MILLIGRAM(S): 400 CAPSULE ORAL at 05:08

## 2021-10-18 RX ADMIN — Medication 650 MILLIGRAM(S): at 17:07

## 2021-10-18 NOTE — PROGRESS NOTE ADULT - ATTENDING COMMENTS
Chart reviewed    Patient seen and examined    Agree with plan as outlined    89 yo female with PMH HTN and glaucoma presents to ED after syncopal episode. Cariology consulted for syncope.     Syncope  - Trops negative, unlikely ischemic event   - BNP wnl, no signs of volume overload  - Stress and carotid US <5 years ago wnl.   - Syncope overall seems most likely to be on the basis of dehydration and perhaps a vasovagal phenomenon  - was Orthostatics positive s/p IVF , would  recheck  - Compression stockings  - EKG showed NSR with 1st degree AVB, unremarkable for etiology for her syncope  -TTE nL LV & RV size and function no significant valvular dysfunction EF 65%  - Head CT no ICH

## 2021-10-18 NOTE — PROVIDER CONTACT NOTE (OTHER) - ACTION/TREATMENT ORDERED:
Pt already given Oxycodone with no relief and was given tylenol no so long ago. Dr Sanches will come to see pt

## 2021-10-18 NOTE — PROGRESS NOTE ADULT - ASSESSMENT
89 yo female with PMH HTN and glaucoma presents to ED after syncopal episode. Cariology consulted for syncope.     Syncope  - Trops negative, unlikely ischemic event   - BNP wnl, no signs of volume overload  - Stress and carotid US <5 years ago wnl.   - Syncope overall seems most likely to be on the basis of dehydration and perhaps a vasovagal phenomenon  - was Orthostatics positive s/p IVF , would  recheck  - Compression stockings  - EKG showed NSR with 1st degree AVB, unremarkable for etiology for her syncope  -TTE nL LV & RV size and function no significant valvular dysfunction EF 65%  - Head CT no ICH    HTN  - BP: 155/84 (10-18-21 @ 05:34) (101/64 - 155/84)  - Continue Enalapril as tolerated     - Monitor and replete lytes, keep K>4, Mg>2.  - All other medical needs as per primary team.  - Other cardiovascular workup will depend on clinical course.  - Will continue to follow.    Re Valdez, MS ANP, AGACNP  Nurse Practitioner- Cardiology   Spectra #[phone]

## 2021-10-18 NOTE — CHART NOTE - NSCHARTNOTEFT_GEN_A_CORE
Called by RN for patient complaining of back pain. Patient seen and examined at bedside. Patient was admitted for difficulty ambulating s/p fall on 10/12. Patient states that she has been experiencing back pain since this admission. Currently, her back pain is located in her lower thoracic region and feels as if it is squeezing. She states she is currently in 10/10 pain. Of note, patient has not had a bowel movement since admission. Lumbar X-Ray was performed today as patient continued to complain of pain, on personal read shows large stool burden. Per RN, patient was recently given Oxycodone and Tylenol without improvement of pain.       T(C): 36.4 (10-18-21 @ 16:44), Max: 36.8 (10-17-21 @ 20:23)  HR: 82 (10-18-21 @ 16:44) (60 - 82)  BP: 146/75 (10-18-21 @ 16:44) (145/78 - 155/84)  RR: 18 (10-18-21 @ 16:44) (17 - 18)  SpO2: 95% (10-18-21 @ 16:44) (92% - 95%)  Wt(kg): --    Physical Exam:  Gen: in moderate distress 2/2 back pain  Abdomen: soft, nontender, ++distension, +BS x4 quadrants, no guarding  Extremities: no clubbing, cyanosis or edema, +2 pedal pulses  MSK: spasms of paraspinal musculature on the left, warm and boggy texture of skin  Neuro: AAOx3, moving all 4 extremities, sensation intact  Skin: no rashes appreciated    Assessment/Plan  89yo female with PMH of HTN and glaucoma presents to ED after syncopal episode, admitted for inability to ambulate.  - Patient with continued back pain despite pain regimen of Tramadol 25mg q4h for moderate pain, Oxycodone 5mg q4h for severe pain, standing Tylenol 650mg q6h, lidocaine patch and methocarbamol  - Lumbar X-ray reviewed from today with considerate stool burden, which is likely the source of her discomfort  - Will order FLEET enema now  - Will continue to monitor H/O pilonidal cyst

## 2021-10-19 VITALS
OXYGEN SATURATION: 94 % | TEMPERATURE: 98 F | RESPIRATION RATE: 18 BRPM | DIASTOLIC BLOOD PRESSURE: 69 MMHG | HEART RATE: 76 BPM | SYSTOLIC BLOOD PRESSURE: 132 MMHG
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
## 2021-10-19 DIAGNOSIS — Z74.09 OTHER REDUCED MOBILITY: ICD-10-CM

## 2021-10-19 DIAGNOSIS — S22.000A WEDGE COMPRESSION FRACTURE OF UNSPECIFIED THORACIC VERTEBRA, INITIAL ENCOUNTER FOR CLOSED FRACTURE: ICD-10-CM

## 2021-10-19 LAB
ANION GAP SERPL CALC-SCNC: 5 MMOL/L — SIGNIFICANT CHANGE UP (ref 5–17)
BUN SERPL-MCNC: 30 MG/DL — HIGH (ref 7–23)
CALCIUM SERPL-MCNC: 9.3 MG/DL — SIGNIFICANT CHANGE UP (ref 8.5–10.1)
CHLORIDE SERPL-SCNC: 109 MMOL/L — HIGH (ref 96–108)
CO2 SERPL-SCNC: 27 MMOL/L — SIGNIFICANT CHANGE UP (ref 22–31)
CREAT SERPL-MCNC: 1 MG/DL — SIGNIFICANT CHANGE UP (ref 0.5–1.3)
GLUCOSE SERPL-MCNC: 120 MG/DL — HIGH (ref 70–99)
HCT VFR BLD CALC: 40.1 % — SIGNIFICANT CHANGE UP (ref 34.5–45)
HGB BLD-MCNC: 12.9 G/DL — SIGNIFICANT CHANGE UP (ref 11.5–15.5)
MCHC RBC-ENTMCNC: 30.9 PG — SIGNIFICANT CHANGE UP (ref 27–34)
MCHC RBC-ENTMCNC: 32.2 GM/DL — SIGNIFICANT CHANGE UP (ref 32–36)
MCV RBC AUTO: 95.9 FL — SIGNIFICANT CHANGE UP (ref 80–100)
NRBC # BLD: 0 /100 WBCS — SIGNIFICANT CHANGE UP (ref 0–0)
PLATELET # BLD AUTO: 255 K/UL — SIGNIFICANT CHANGE UP (ref 150–400)
POTASSIUM SERPL-MCNC: 5.4 MMOL/L — HIGH (ref 3.5–5.3)
POTASSIUM SERPL-SCNC: 5.4 MMOL/L — HIGH (ref 3.5–5.3)
RBC # BLD: 4.18 M/UL — SIGNIFICANT CHANGE UP (ref 3.8–5.2)
RBC # FLD: 14.1 % — SIGNIFICANT CHANGE UP (ref 10.3–14.5)
SODIUM SERPL-SCNC: 141 MMOL/L — SIGNIFICANT CHANGE UP (ref 135–145)
WBC # BLD: 8.57 K/UL — SIGNIFICANT CHANGE UP (ref 3.8–10.5)
WBC # FLD AUTO: 8.57 K/UL — SIGNIFICANT CHANGE UP (ref 3.8–10.5)

## 2021-10-19 PROCEDURE — 85027 COMPLETE CBC AUTOMATED: CPT

## 2021-10-19 PROCEDURE — 97530 THERAPEUTIC ACTIVITIES: CPT

## 2021-10-19 PROCEDURE — 84484 ASSAY OF TROPONIN QUANT: CPT

## 2021-10-19 PROCEDURE — 97162 PT EVAL MOD COMPLEX 30 MIN: CPT

## 2021-10-19 PROCEDURE — 83880 ASSAY OF NATRIURETIC PEPTIDE: CPT

## 2021-10-19 PROCEDURE — 86769 SARS-COV-2 COVID-19 ANTIBODY: CPT

## 2021-10-19 PROCEDURE — 97535 SELF CARE MNGMENT TRAINING: CPT

## 2021-10-19 PROCEDURE — 80053 COMPREHEN METABOLIC PANEL: CPT

## 2021-10-19 PROCEDURE — 97166 OT EVAL MOD COMPLEX 45 MIN: CPT

## 2021-10-19 PROCEDURE — 72100 X-RAY EXAM L-S SPINE 2/3 VWS: CPT

## 2021-10-19 PROCEDURE — 71260 CT THORAX DX C+: CPT | Mod: MG

## 2021-10-19 PROCEDURE — 80061 LIPID PANEL: CPT

## 2021-10-19 PROCEDURE — 85730 THROMBOPLASTIN TIME PARTIAL: CPT

## 2021-10-19 PROCEDURE — 85610 PROTHROMBIN TIME: CPT

## 2021-10-19 PROCEDURE — 93005 ELECTROCARDIOGRAM TRACING: CPT

## 2021-10-19 PROCEDURE — 84550 ASSAY OF BLOOD/URIC ACID: CPT

## 2021-10-19 PROCEDURE — 72125 CT NECK SPINE W/O DYE: CPT | Mod: MG

## 2021-10-19 PROCEDURE — 71045 X-RAY EXAM CHEST 1 VIEW: CPT

## 2021-10-19 PROCEDURE — 96375 TX/PRO/DX INJ NEW DRUG ADDON: CPT

## 2021-10-19 PROCEDURE — 80048 BASIC METABOLIC PNL TOTAL CA: CPT

## 2021-10-19 PROCEDURE — U0005: CPT

## 2021-10-19 PROCEDURE — 84480 ASSAY TRIIODOTHYRONINE (T3): CPT

## 2021-10-19 PROCEDURE — 82550 ASSAY OF CK (CPK): CPT

## 2021-10-19 PROCEDURE — 87635 SARS-COV-2 COVID-19 AMP PRB: CPT

## 2021-10-19 PROCEDURE — 99232 SBSQ HOSP IP/OBS MODERATE 35: CPT

## 2021-10-19 PROCEDURE — 97116 GAIT TRAINING THERAPY: CPT

## 2021-10-19 PROCEDURE — G1004: CPT

## 2021-10-19 PROCEDURE — 86431 RHEUMATOID FACTOR QUANT: CPT

## 2021-10-19 PROCEDURE — 83874 ASSAY OF MYOGLOBIN: CPT

## 2021-10-19 PROCEDURE — 83690 ASSAY OF LIPASE: CPT

## 2021-10-19 PROCEDURE — 96374 THER/PROPH/DIAG INJ IV PUSH: CPT

## 2021-10-19 PROCEDURE — 97110 THERAPEUTIC EXERCISES: CPT

## 2021-10-19 PROCEDURE — 85025 COMPLETE CBC W/AUTO DIFF WBC: CPT

## 2021-10-19 PROCEDURE — 74177 CT ABD & PELVIS W/CONTRAST: CPT | Mod: MG

## 2021-10-19 PROCEDURE — 82553 CREATINE MB FRACTION: CPT

## 2021-10-19 PROCEDURE — 84436 ASSAY OF TOTAL THYROXINE: CPT

## 2021-10-19 PROCEDURE — 93306 TTE W/DOPPLER COMPLETE: CPT

## 2021-10-19 PROCEDURE — 96361 HYDRATE IV INFUSION ADD-ON: CPT

## 2021-10-19 PROCEDURE — 99285 EMERGENCY DEPT VISIT HI MDM: CPT | Mod: 25

## 2021-10-19 PROCEDURE — 70450 CT HEAD/BRAIN W/O DYE: CPT | Mod: MG

## 2021-10-19 PROCEDURE — 97112 NEUROMUSCULAR REEDUCATION: CPT

## 2021-10-19 PROCEDURE — 83036 HEMOGLOBIN GLYCOSYLATED A1C: CPT

## 2021-10-19 PROCEDURE — U0003: CPT

## 2021-10-19 PROCEDURE — 36415 COLL VENOUS BLD VENIPUNCTURE: CPT

## 2021-10-19 PROCEDURE — 84443 ASSAY THYROID STIM HORMONE: CPT

## 2021-10-19 RX ORDER — AMLODIPINE BESYLATE 2.5 MG/1
1 TABLET ORAL
Qty: 0 | Refills: 0 | DISCHARGE
Start: 2021-10-19

## 2021-10-19 RX ORDER — SENNA PLUS 8.6 MG/1
2 TABLET ORAL
Qty: 0 | Refills: 0 | DISCHARGE
Start: 2021-10-19

## 2021-10-19 RX ORDER — PSYLLIUM SEED (WITH DEXTROSE)
0 POWDER (GRAM) ORAL
Qty: 0 | Refills: 0 | DISCHARGE
Start: 2021-10-19

## 2021-10-19 RX ORDER — AMLODIPINE BESYLATE 2.5 MG/1
10 TABLET ORAL DAILY
Refills: 0 | Status: DISCONTINUED | OUTPATIENT
Start: 2021-10-19 | End: 2021-10-19

## 2021-10-19 RX ADMIN — HEPARIN SODIUM 5000 UNIT(S): 5000 INJECTION INTRAVENOUS; SUBCUTANEOUS at 14:35

## 2021-10-19 RX ADMIN — Medication 1000 UNIT(S): at 11:25

## 2021-10-19 RX ADMIN — LIDOCAINE 2 PATCH: 4 CREAM TOPICAL at 05:43

## 2021-10-19 RX ADMIN — GABAPENTIN 300 MILLIGRAM(S): 400 CAPSULE ORAL at 14:34

## 2021-10-19 RX ADMIN — Medication 650 MILLIGRAM(S): at 01:40

## 2021-10-19 RX ADMIN — Medication 650 MILLIGRAM(S): at 05:44

## 2021-10-19 RX ADMIN — Medication 20 MILLIGRAM(S): at 05:44

## 2021-10-19 RX ADMIN — Medication 650 MILLIGRAM(S): at 11:25

## 2021-10-19 RX ADMIN — GABAPENTIN 300 MILLIGRAM(S): 400 CAPSULE ORAL at 05:44

## 2021-10-19 RX ADMIN — METHOCARBAMOL 500 MILLIGRAM(S): 500 TABLET, FILM COATED ORAL at 05:44

## 2021-10-19 RX ADMIN — METHOCARBAMOL 500 MILLIGRAM(S): 500 TABLET, FILM COATED ORAL at 11:25

## 2021-10-19 RX ADMIN — LIDOCAINE 2 PATCH: 4 CREAM TOPICAL at 11:49

## 2021-10-19 RX ADMIN — HEPARIN SODIUM 5000 UNIT(S): 5000 INJECTION INTRAVENOUS; SUBCUTANEOUS at 05:44

## 2021-10-19 RX ADMIN — Medication 650 MILLIGRAM(S): at 06:50

## 2021-10-19 RX ADMIN — Medication 500 MILLIGRAM(S): at 11:25

## 2021-10-19 NOTE — CONSULT NOTE ADULT - SUBJECTIVE AND OBJECTIVE BOX
Physical Medicine and Rehabilitation Initial Evaluation    Patients acute care records reviewed and are summarized as follows:     Patient is a 89F with past medical history including hypertension, glaucoma who is admitted to acute care following a syncopal episode while patient was having a bowel movement. Patient woke up on the floor and notes she has history of passing out. She woke up with bilateral rib and flank pain as well as chest pain.    Radiological studies reviewed, including CT of the chest which includes thoracic spine window, significant for thoracic level T10 compression deformity, chronic in appearance.    Medical studies/laboratory studies reviewed, includin.9   8.57  )-----------( 255      ( 19 Oct 2021 07:49 )             40.1   10-19    141  |  109<H>  |  30<H>  ----------------------------<  120<H>  5.4<H>   |  27  |  1.00    Ca    9.3      19 Oct 2021 07:49    The patient was seen and examined at bedside. patient has complaint of low back pain. Pain is described as dull and achy, nonradiating and not accompanied by any symptoms. Severity is 810 at worst but has recently been more manageable around 5 to 6/10. It is worse with palpation and weight bearing, improved with rest and pain medications at times.    ROS:  Constitutional: Denies fevers or chills  Eyes: Denies blurry vision or double vision  Ears/Nose/Mouth/Throat: Denies any pain on swallowing or dry mouth or runny nose  CV: Denies chest pain or palpitations  Respiratory: Denies cough or dyspnea  GI: Denies nausea, vomiting, abdominal pain  : Denies urinary frequency or dysuria  MSK: low back pain, generalized weakness, unsteadiness on her feet  Neuro: Denies any headache or dizziness  Psychiatric: Denies depression or anxiety    PM, Social, Family Hx: as above in HPI, Family history reviewed and found to be non pertinent to patients current disposition, denies history of alcohol, illicit drug use, tobacco use.    Physical Exam:   Vitals: Vital Signs Last 24 Hrs  T(C): 36.7 (19 Oct 2021 12:34), Max: 36.7 (19 Oct 2021 12:34)  T(F): 98.1 (19 Oct 2021 12:34), Max: 98.1 (19 Oct 2021 12:34)  HR: 76 (19 Oct 2021 12:34) (76 - 82)  BP: 132/69 (19 Oct 2021 12:34) (132/69 - 146/75)  BP(mean): --  RR: 18 (19 Oct 2021 12:34) (18 - 18)  SpO2: 94% (19 Oct 2021 12:34) (93% - 95%)    Constitutional: Gen: In no acute distress, cooperative with exam and questioning   Eyes: PERRL, no erythema of conjunctivae, arcus senilis bilaterally  Ears/Nose/Mouth/Throat: Mucous membranes moist, no thrush, no rhinorrhea  CV: Regular rate and rhythm, S1 S2, bilateral lower extremity pitting peripheral edema, pedal pulses intact  Resp: Good respiratory effort, Good air movement all lung fields  GI: Nontender, normoactive bowel sounds  Neuro: Cranial Nerves II-XII intact, sensation intact to light touch in peripheral upper and lower extremities  MSK: No cyanosis or clubbing of nails, strength 4- to 4/5 in bilateral upper and lower extremities, ROM full in all extremities passively, hypertonicity and tenderness to the thoracic and lumbar region paraspinal musculature bilaterally, no midline tenderness noted, several discrete tender points noted with pain referral pattern, no hyperreflexia noted, downgoing Babinski bilaterally  Neck: No midline tenderness to palpation, supple  Skin: No rashes on limbs, normal temperature on palpation  Psychiatric: Awake alert fully oriented

## 2021-10-19 NOTE — PROGRESS NOTE ADULT - SUBJECTIVE AND OBJECTIVE BOX
[facility] -- Ted Sloan, Ashish, Shalonda, Juan Pablo Boone Savella, Goodger: Office # [phone]    Follow Up:  Sycope    Subjective/Observations: Patient seen and examined. Patient awake, alert, resting comfortably in bed. No complaints of chest pain, dyspnea, palpitations or dizziness. Tolerating room air. Pt with c/o back pain from fall.    REVIEW OF SYSTEMS: All review of systems is negative for eye, ENT, GI, , allergic, dermatologic, musculoskeletal and neurologic except as described above    PAST MEDICAL & SURGICAL HISTORY:  HTN (hypertension)    Glaucoma    No significant past surgical history        MEDICATIONS  (STANDING):  acetaminophen   Tablet .. 650 milliGRAM(s) Oral every 6 hours  ascorbic acid 500 milliGRAM(s) Oral daily  cholecalciferol 1000 Unit(s) Oral daily  enalapril 20 milliGRAM(s) Oral every 24 hours  gabapentin 100 milliGRAM(s) Oral three times a day  heparin   Injectable 5000 Unit(s) SubCutaneous every 8 hours  lidocaine   4% Patch 2 Patch Transdermal daily  methocarbamol 500 milliGRAM(s) Oral three times a day  polyethylene glycol 3350 17 Gram(s) Oral two times a day    MEDICATIONS  (PRN):  oxyCODONE    IR 5 milliGRAM(s) Oral every 4 hours PRN Severe Pain (7 - 10)  traMADol 25 milliGRAM(s) Oral every 4 hours PRN Moderate Pain (4 - 6)    Allergies    No Known Allergies    Intolerances      Vital Signs Last 24 Hrs  T(C): 36.7 (18 Oct 2021 05:34), Max: 36.8 (17 Oct 2021 20:23)  T(F): 98.1 (18 Oct 2021 05:34), Max: 98.3 (17 Oct 2021 20:23)  HR: 60 (18 Oct 2021 05:34) (60 - 83)  BP: 155/84 (18 Oct 2021 05:34) (101/64 - 155/84)  BP(mean): --  RR: 18 (18 Oct 2021 05:34) (18 - 18)  SpO2: 93% (18 Oct 2021 05:34) (92% - 95%)  I&O's Summary    17 Oct 2021 07:01  -  18 Oct 2021 07:00  --------------------------------------------------------  IN: 489 mL / OUT: 700 mL / NET: -211 mL          TELE: Not on telemetry   PHYSICAL EXAM:  Appearance: NAD, no distress, alert, Well developed   HEENT: Moist Mucous Membranes, Anicteric  Cardiovascular: Regular rate and rhythm, Normal S1 S2, No JVD, No murmurs, No rubs, gallops or clicks  Respiratory: Non-labored, Clear to auscultation, No rales, No rhonchi, No wheezing.   Gastrointestinal:  Soft, Non-tender, + BS  Skin: Warm and dry, No visible rashes or ulcers, No ecchymosis, No cyanosis  Musculoskeletal: No clubbing, No cyanosis, + Lower back tenderness  Psychiatry: Mood & affect appropriate  Lymph: No peripheral edema.     LABS: All Labs Reviewed:            Troponin I, Serum: <.015 ng/mL (10-12-21 @ 17:33)  Creatine Kinase, Serum: 83 U/L (10-12-21 @ 17:33)  Troponin I, Serum: <.015 ng/mL (10-12-21 @ 11:21)          Triiodothyronine, Total (T3 Total): 86 ng/dL (10-13-21 @ 13:26)    Triglycerides, Serum: 109 mg/dL (10-13-21 @ 13:00)  Cholesterol, Serum: 244 mg/dL (10-13-21 @ 13:00)  HDL Cholesterol, Serum: 58 mg/dL (10-13-21 @ 13:00)      12 Lead ECG:   Ventricular Rate 75 BPM    Atrial Rate 75 BPM    P-R Interval 218 ms    QRS Duration 82 ms    Q-T Interval 394 ms    QTC Calculation(Bazett) 439 ms    P Axis 42 degrees    R Axis -28 degrees    T Axis 57 degrees    Diagnosis Line Sinus rhythm with 1st degree AV block  Voltage criteria for left ventricular hypertrophy  Abnormal ECG  When compared with ECG of 14-NOV-2008 18:14,  QRS axis shifted left  Confirmed by Dash Liz MD ([id number]) on 10/12/2021 1:57:15 PM (10-12-21 @ 10:32)    < from: TTE Echo Complete w/o Contrast w/ Doppler (10.13.21 @ 08:47) >   EXAM:  ECHO TTE WO CON COMP W DOPP    PROCEDURE DATE:  10/13/2021    INTERPRETATION:  INDICATION: Syncope  Sonographer H  Blood Pressure 150/72    Height unavailable     Weight 63 kg  Dimensions:  LA 2.9       Normal Values: 2.0 - 4.0 cm  Ao 2.6        Normal Values: 2.0 - 3.8 cm  SEPTUM 1.0       Normal Values: 0.6 - 1.2 cm  PWT 0.9       Normal Values: 0.6 - 1.1 cm  LVIDd 4.0         Normal Values: 3.0 - 5.6 cm  LVIDs 2.6         Normal Values: 1.8 - 4.0 cm  OBSERVATIONS:  Mitral Valve: Mitral annular calcification, trace physiologic MR.  Aortic Valve/Aorta: Aortic valve is not well-visualized but appears sclerotic with likely normal opening  Tricuspid Valve: normal with trace TR.  Pulmonic Valve: Not well-visualized  Left Atrium: normal  Right Atrium: Not well-visualized  Left Ventricle: normal LV size and systolic function, estimated LVEF of 65%.  Right Ventricle: Grossly normal size and systolic function.  Pericardium: no significant pericardial effusion.  Pulmonary/RV Pressure: estimated PA systolic pressure of 25mmHg    IMPRESSION:  Normal left ventricular internal dimensions and systolic function, estimated LVEF of 65%.  Grossly normal RV size and systolic function.  Aortic valve is not well-visualizedbut appears sclerotic with likely normal opening  Trace physiologic MR and TR.  No significant pericardial effusion.  --- End of Report ---    < end of copied text >      < from: CT Chest w/ IV Cont (10.12.21 @ 12:50) >  IMPRESSION: No evidence of acute visceral organ or bony injury. Intra and extrahepatic biliary dilatation and pancreatic ductal dilatation, likely related to the patient's stated age and a postcholecystectomy reservoir effect.  --- End of Report ---  < end of copied text >  < from: CT Head No Cont (10.12.21 @ 12:45) >  IMPRESSION:  1)  chronic ischemic changes noted in both hemispheres with volume loss. No acute abnormality or hemorrhagic lesion noted.  2)  clear sinuses and mastoids..  --- End of Report ---      < end of copied text >  < from: Xray Chest 1 View- PORTABLE-Urgent (Xray Chest 1 View- PORTABLE-Urgent .) (10.12.21 @ 11:14) >  EXAM:  XR CHEST PORTABLE URGENT 1V                        PROCEDURE DATE:  10/12/2021   INTERPRETATION:  AP semierect chest on October 12, 2021 at 11:12 AM. Patient had a syncopal episode.  COMPARISON: None available.  Heart magnified by technique but possibly enlarged.  There is a mild left base atelectatic infiltrate.  IMPRESSION: As above.  --- End of Report ---            < end of copied text >    
[facility] -- Ted Sloan, Ashish, Shalonda, Juan Pablo Boone Savella  Office # [phone]      Follow Up:  syncope    Subjective/Observations:     No complaints of chest pain, dyspnea, or palpitations reported. No signs of orthopnea or PND.  + back pain since syncope     REVIEW OF SYSTEMS: All other review of systems is negative unless indicated above    PAST MEDICAL & SURGICAL HISTORY:  HTN (hypertension)    Glaucoma    No significant past surgical history        MEDICATIONS  (STANDING):  acetaminophen   Tablet .. 650 milliGRAM(s) Oral every 6 hours  ascorbic acid 500 milliGRAM(s) Oral daily  cholecalciferol 1000 Unit(s) Oral daily  enalapril 20 milliGRAM(s) Oral every 24 hours  gabapentin 300 milliGRAM(s) Oral three times a day  heparin   Injectable 5000 Unit(s) SubCutaneous every 8 hours  lidocaine   4% Patch 2 Patch Transdermal daily  methocarbamol 500 milliGRAM(s) Oral three times a day  polyethylene glycol 3350 17 Gram(s) Oral two times a day  psyllium Powder 1 Packet(s) Oral daily  senna 2 Tablet(s) Oral at bedtime    MEDICATIONS  (PRN):  bisacodyl 5 milliGRAM(s) Oral every 12 hours PRN Constipation  oxyCODONE    IR 5 milliGRAM(s) Oral every 4 hours PRN Severe Pain (7 - 10)  traMADol 25 milliGRAM(s) Oral every 4 hours PRN Moderate Pain (4 - 6)      Allergies    No Known Allergies    Intolerances        Vital Signs Last 24 Hrs  T(C): 36.5 (19 Oct 2021 05:40), Max: 36.6 (18 Oct 2021 11:26)  T(F): 97.7 (19 Oct 2021 05:40), Max: 97.9 (18 Oct 2021 11:26)  HR: 79 (19 Oct 2021 05:40) (71 - 82)  BP: 135/72 (19 Oct 2021 05:40) (135/72 - 149/77)  BP(mean): --  RR: 18 (19 Oct 2021 05:40) (17 - 18)  SpO2: 93% (19 Oct 2021 05:40) (93% - 95%)    I&O's Summary    18 Oct 2021 07:01  -  19 Oct 2021 07:00  --------------------------------------------------------  IN: 240 mL / OUT: 0 mL / NET: 240 mL          PHYSICAL EXAM:  TELE: not on tel e  Constitutional: NAD, awake and alert, well-developed  HEENT: Moist Mucous Membranes, Anicteric  Pulmonary: Non-labored, breath sounds are clear bilaterally, No wheezing, crackles or rhonchi  Cardiovascular: Regular, S1 and S2 nl, No murmurs, rubs, gallops or clicks  Gastrointestinal: Bowel Sounds present, soft, nontender.   Lymph: No lymphadenopathy. No peripheral edema.  Skin: No visible rashes or ulcers.  Psych:  Mood & affect appropriate    LABS: All Labs Reviewed:                        12.9 8.57  )-----------( 255      ( 19 Oct 2021 07:49 )             40.1     19 Oct 2021 07:49    141    |  109    |  30     ----------------------------<  120    5.4     |  27     |  1.00     Ca    9.3        19 Oct 2021 07:49      12 Lead ECG:   Ventricular Rate 75 BPM    Atrial Rate 75 BPM    P-R Interval 218 ms    QRS Duration 82 ms    Q-T Interval 394 ms    QTC Calculation(Bazett) 439 ms    P Axis 42 degrees    R Axis -28 degrees    T Axis 57 degrees    Diagnosis Line Sinus rhythm with 1st degree AV block  Voltage criteria for left ventricular hypertrophy  Abnormal ECG  When compared with ECG of 14-NOV-2008 18:14,  QRS axis shifted left  Confirmed by Dash Liz MD ([id number]) on 10/12/2021 1:57:15 PM (10-12-21 @ 10:32)    < from: TTE Echo Complete w/o Contrast w/ Doppler (10.13.21 @ 08:47) >   EXAM:  ECHO TTE WO CON COMP W DOPP    PROCEDURE DATE:  10/13/2021    INTERPRETATION:  INDICATION: Syncope  Sonographer H  Blood Pressure 150/72    Height unavailable     Weight 63 kg  Dimensions:  LA 2.9       Normal Values: 2.0 - 4.0 cm  Ao 2.6        Normal Values: 2.0 - 3.8 cm  SEPTUM 1.0       Normal Values: 0.6 - 1.2 cm  PWT 0.9       Normal Values: 0.6 - 1.1 cm  LVIDd 4.0         Normal Values: 3.0 - 5.6 cm  LVIDs 2.6         Normal Values: 1.8 - 4.0 cm  OBSERVATIONS:  Mitral Valve: Mitral annular calcification, trace physiologic MR.  Aortic Valve/Aorta: Aortic valve is not well-visualized but appears sclerotic with likely normal opening  Tricuspid Valve: normal with trace TR.  Pulmonic Valve: Not well-visualized  Left Atrium: normal  Right Atrium: Not well-visualized  Left Ventricle: normal LV size and systolic function, estimated LVEF of 65%.  Right Ventricle: Grossly normal size and systolic function.  Pericardium: no significant pericardial effusion.  Pulmonary/RV Pressure: estimated PA systolic pressure of 25mmHg    IMPRESSION:  Normal left ventricular internal dimensions and systolic function, estimated LVEF of 65%.  Grossly normal RV size and systolic function.  Aortic valve is not well-visualizedbut appears sclerotic with likely normal opening  Trace physiologic MR and TR.  No significant pericardial effusion.  --- End of Report ---    < end of copied text >      < from: CT Chest w/ IV Cont (10.12.21 @ 12:50) >  IMPRESSION: No evidence of acute visceral organ or bony injury. Intra and extrahepatic biliary dilatation and pancreatic ductal dilatation, likely related to the patient's stated age and a postcholecystectomy reservoir effect.  --- End of Report ---  < end of copied text >  < from: CT Head No Cont (10.12.21 @ 12:45) >  IMPRESSION:  1)  chronic ischemic changes noted in both hemispheres with volume loss. No acute abnormality or hemorrhagic lesion noted.  2)  clear sinuses and mastoids..  --- End of Report ---      < end of copied text >  < from: Xray Chest 1 View- PORTABLE-Urgent (Xray Chest 1 View- PORTABLE-Urgent .) (10.12.21 @ 11:14) >  EXAM:  XR CHEST PORTABLE URGENT 1V                        PROCEDURE DATE:  10/12/2021   INTERPRETATION:  AP semierect chest on October 12, 2021 at 11:12 AM. Patient had a syncopal episode.  COMPARISON: None available.  Heart magnified by technique but possibly enlarged.  There is a mild left base atelectatic infiltrate.  IMPRESSION: As above.            
[facility] -- Ted Sloan, Ashish, Shalonda, Mely, Nydia Almeida  Office # [phone]      Follow Up:  Syncope    Subjective/Observations: Seen and examined.  Resting in bed sitting up with complaints of back pain.  Pt denies cp, sob or palpitations or dizziness or lightheadedness.  NAD.  Tele reviewed.       REVIEW OF SYSTEMS: All other review of systems is negative unless indicated above    PAST MEDICAL & SURGICAL HISTORY:  HTN (hypertension)    Glaucoma        MEDICATIONS  (STANDING):  ascorbic acid 500 milliGRAM(s) Oral daily  aspirin enteric coated 81 milliGRAM(s) Oral daily  cholecalciferol 1000 Unit(s) Oral daily  enalapril 5 milliGRAM(s) Oral daily  heparin   Injectable 5000 Unit(s) SubCutaneous every 12 hours  methocarbamol 500 milliGRAM(s) Oral three times a day    MEDICATIONS  (PRN):  acetaminophen   Tablet .. 650 milliGRAM(s) Oral every 6 hours PRN Severe Pain (7 - 10)      Allergies    No Known Allergies    Intolerances            Vital Signs Last 24 Hrs  T(C): 36.9 (13 Oct 2021 11:13), Max: 37.2 (13 Oct 2021 04:12)  T(F): 98.4 (13 Oct 2021 11:13), Max: 99 (13 Oct 2021 04:12)  HR: 94 (13 Oct 2021 13:35) (75 - 98)  BP: 117/70 (13 Oct 2021 13:35) (117/70 - 200/125)  BP(mean): --  RR: 16 (13 Oct 2021 11:13) (16 - 18)  SpO2: 98% (13 Oct 2021 13:35) (92% - 98%)    I&O's Summary        PHYSICAL EXAM:  TELE: SR 80 no events  Constitutional: NAD, awake and alert, well-developed  HEENT: Moist Mucous Membranes, Anicteric  Pulmonary: Non-labored, breath sounds are clear bilaterally, No wheezing, rales or rhonchi  Cardiovascular: Regular, S1 and S2, No murmurs, rubs, gallops or clicks  Gastrointestinal: Bowel Sounds present, soft, nontender.   Lymph: No peripheral edema. No lymphadenopathy.  Skin: No visible rashes or ulcers.  Psych:  Mood & affect appropriate    LABS: All Labs Reviewed:                        13.8   11.14 )-----------( 219      ( 13 Oct 2021 07:51 )             41.6                         14.1   13.61 )-----------( 248      ( 12 Oct 2021 11:21 )             42.7     13 Oct 2021 07:51    138    |  104    |  34     ----------------------------<  91     4.2     |  27     |  0.96   12 Oct 2021 11:21    138    |  106    |  37     ----------------------------<  144    4.7     |  29     |  1.10     Ca    9.6        13 Oct 2021 07:51  Ca    10.1       12 Oct 2021 11:21    TPro  7.4    /  Alb  3.4    /  TBili  1.1    /  DBili  x      /  AST  91     /  ALT  73     /  AlkPhos  84     13 Oct 2021 07:51  TPro  8.0    /  Alb  3.5    /  TBili  0.5    /  DBili  x      /  AST  40     /  ALT  29     /  AlkPhos  71     12 Oct 2021 11:21    PT/INR - ( 12 Oct 2021 11:21 )   PT: 11.9 sec;   INR: 1.02 ratio         PTT - ( 12 Oct 2021 11:21 )  PTT:28.0 sec  CARDIAC MARKERS ( 12 Oct 2021 17:36 )  x     / x     / x     / x     / 2.4 ng/mL  CARDIAC MARKERS ( 12 Oct 2021 17:33 )  <.015 ng/mL / x     / 83 U/L / x     / x      CARDIAC MARKERS ( 12 Oct 2021 11:21 )  <.015 ng/mL / x     / x     / x     / x        < from: 12 Lead ECG (10.12.21 @ 10:32) >  Ventricular Rate 75 BPM    Atrial Rate 75 BPM    P-R Interval 218 ms    QRS Duration 82 ms    Q-T Interval 394 ms    QTC Calculation(Bazett) 439 ms    P Axis 42 degrees    R Axis -28 degrees    T Axis 57 degrees    Diagnosis Line Sinus rhythm with 1st degree AV block  Voltage criteria for left ventricular hypertrophy  Abnormal ECG  When compared with ECG of 14-NOV-2008 18:14,  QRS axis shifted left  Confirmed by Dash Liz MD ([id number]) on 10/12/2021 1:57:15 PM    < end of copied text >      < from: CT Chest w/ IV Cont (10.12.21 @ 12:50) >  EXAM:  CT CHEST IC                          EXAM:  CT ABDOMEN AND PELVIS IC                            PROCEDURE DATE:  10/12/2021          INTERPRETATION:  CLINICAL INFORMATION: Syncope, fall.    COMPARISON: None.    CONTRAST/COMPLICATIONS:  IV Contrast: IV contrast documented in associated exam  Oral Contrast: NONE  Complications: None reported at time of study completion    PROCEDURE:  CT of the Chest, Abdomen and Pelvis was performed.  Sagittal and coronal reformats were performed.    FINDINGS:  CHEST:  LUNGS AND LARGE AIRWAYS: Patent central airways. Mild linear atelectasis in the left lower lobe and in the inferior lingula. There is scattered mucous plugging at the lung apices.  PLEURA: No pleural effusion. No pneumothorax or pneumomediastinum.  VESSELS: Within normal limits.  HEART: Heart size is normal. No pericardial effusion.  MEDIASTINUM AND CARLA: No lymphadenopathy.  CHEST WALL AND LOWER NECK: Within normal limits.    ABDOMEN AND PELVIS:  LIVER: Within normal limits.  BILE DUCTS: There is moderate intra and extrahepatic biliary dilatation extending to the ampulla..  GALLBLADDER: Removed.  SPLEEN: Within normal limits.  PANCREAS: The pancreas is mildly atrophic. There is prominence of the pancreatic duct within the head and neck to 5 mm. There is no peripancreatic stranding or fluid collection.  ADRENALS: Within normal limits.  KIDNEYS/URETERS: Within normal limits.    BLADDER: Within normal limits.  REPRODUCTIVE ORGANS: Uterus and adnexa within normal limits.    BOWEL: Sigmoid diverticulosis. No bowel obstruction. Appendix is normal.  PERITONEUM: No ascites.  VESSELS: Within normal limits.  RETROPERITONEUM/LYMPH NODES: No lymphadenopathy.  ABDOMINAL WALL: Within normal limits.  BONES: Their is a chronic appearingsevere compression deformity of T10.    IMPRESSION: No evidence of acute visceral organ or bony injury. Intra and extrahepatic biliary dilatation and pancreatic ductal dilatation, likely related to the patient's stated age and a postcholecystectomy reservoir effect.    --- End of Report ---            CANDIDO DURAN MD; Attending Radiologist  This document has been electronically signed. Oct 12 2021 12:56PM    < end of copied text >       < from: CT Head No Cont (10.12.21 @ 12:45) >    EXAM:  CT BRAIN                            PROCEDURE DATE:  10/12/2021          INTERPRETATION:  INDICATION:  Syncope  TECHNIQUE:  A non contrast 2.5 or 3 mm axial CT study of the brain was performed from skull base to vertex. Coronal and sagittal reformations were generated from the axial data.  COMPARISON EXAMINATION:  No prior    FINDINGS:    HEMISPHERES:  Chronic small vessel ischemic changes are noted throughout the basal ganglia and white matter of both hemispheres. In conjunction, there is volume loss and involutional change. No acute abnormality or hemorrhage noted.  VENTRICLES:  Midline with ex vacuo enlargement.  POSTERIOR FOSSA:  The brain stem and cerebellum are unremarkable.  No CP angle lesion noted.  EXTRACEREBRAL SPACES:  Nosubdural or epidural collections are noted.  SKULL BASE AND CALVARIUM:  Appears intact.  No fracture or destructive lesion is identified.  SINUSES AND MASTOIDS:  Clear.  MISCELLANEOUS:  No orbital or suprasellar abnormality noted.    IMPRESSION:    1)  chronic ischemic changes noted in both hemispheres with volume loss. No acute abnormality or hemorrhagic lesion noted.  2)  clear sinuses and mastoids..    --- End of Report ---            RAF GILBERT MD; Attending Radiologist  This document has been electronically signed. Oct 12 2021 12:44PM    < end of copied text >     < from: CT Cervical Spine No Cont (10.12.21 @ 12:45) >    EXAM:  CT CERVICAL SPINE                            PROCEDURE DATE:  10/12/2021          INTERPRETATION:  CT CERVICAL    INDICATIONS:  neck pain. Trauma.    TECHNIQUE:  Thin section CT imaging was conducted.  3-D, Coronal and sagittal reformations were generated from the axial data.    FINDINGS:    There is alteration of the cervical lordosis which may reflect positioning or spasm.    C1/C2:  The anterior and posterior arches of C1 appear to be intact. There is no C1-C2 subluxation. No odontoidfracture is noted. Base of C2 appears to be intact    The mid and lower cervical vertebral bodies appear to be intact    Degenerative changes are noted, most prominent in the upper cervical region at C2-3 and C3-4.    Prevertebral soft tissues are unremarkable.    IMPRESSION:    No acute fracture identified.    --- End of Report ---            RAF GILBERT MD; Attending Radiologist  This document has been electronically signed. Oct 12 2021  1:13PM    < end of copied text >       
Neurology follow up note    KELSI [id number]      Interval History:    Patient feels less chest pressure no more pain in flanks ribs and around neck     Allergies    No Known Allergies    Intolerances        MEDICATIONS    acetaminophen   Tablet .. 650 milliGRAM(s) Oral every 6 hours  ascorbic acid 500 milliGRAM(s) Oral daily  cholecalciferol 1000 Unit(s) Oral daily  enalapril 20 milliGRAM(s) Oral every 24 hours  gabapentin 300 milliGRAM(s) Oral three times a day  heparin   Injectable 5000 Unit(s) SubCutaneous every 8 hours  lidocaine   4% Patch 2 Patch Transdermal daily  methocarbamol 500 milliGRAM(s) Oral three times a day  oxyCODONE    IR 5 milliGRAM(s) Oral every 4 hours PRN  polyethylene glycol 3350 17 Gram(s) Oral two times a day  traMADol 25 milliGRAM(s) Oral every 4 hours PRN              Vital Signs Last 24 Hrs  T(C): 36.6 (18 Oct 2021 11:26), Max: 36.8 (17 Oct 2021 20:23)  T(F): 97.9 (18 Oct 2021 11:26), Max: 98.3 (17 Oct 2021 20:23)  HR: 71 (18 Oct 2021 11:26) (60 - 71)  BP: 149/77 (18 Oct 2021 11:26) (145/78 - 155/84)  BP(mean): --  RR: 17 (18 Oct 2021 11:26) (17 - 18)  SpO2: 95% (18 Oct 2021 11:26) (92% - 95%)        REVIEW OF SYSTEMS:  Constitutional:  No fever, chills, or night sweats.  Head:  No headaches.  Eyes:  No double vision or blurry vision.  Ears:  No ringing in the ears.  Neck:  Had an episode of neck pain after she had fallen, which appeared to have resolved now.  Respiratory:  No cough or shortness of breath.  Cardiovascular:  Complains of some chest pressure that radiates around to her back.  Abdomen:  No nausea, vomiting, or abdominal pain.  Extremities/Neurological:  No numbness or tingling.  Musculoskeletal:  No joint pain.  Genitourinary:  No burning upon urination.  General:  No history of seeing flashing lights or weird smell such as burning rubber.  No history upon awakening in the past of tongue bite marks or loss of urine.    PHYSICAL EXAMINATION:  HEENT:  Head:  Normocephalic, atraumatic.  Eyes:  No scleral icterus.  Ears:  Hearing bilaterally intact.  NECK:  Supple.  RESPIRATORY:  Good air entry bilaterally.  CARDIOVASCULAR:  S1 and S2 heard.  ABDOMEN:  Soft and nontender.  EXTREMITIES:  No clubbing or cyanosis was noted.      NEUROLOGIC:  The patient is awake and alert.  Extraocular movements were intact.  Speech was fluent.  Smile was symmetric.  Motor:  Bilateral upper and lower were 4+/5.  Sensory:  Bilateral upper and lower intact to light touch.  No drift.            LABS:            Hemoglobin A1C:       Vitamin B12         RADIOLOGY      ANALYSIS AND PLAN:  A 89-year-old with an episode of loss of consciousness.  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  There is no clear history to suggest underlying epilepsy and no new signs to suggest a new cerebrovascular accident has ensued.  Telemetry evaluation.  Monitor systolic blood pressure.  cardiology follow up as needed  For history of hypertension, monitor systolic blood pressure.  Fall precaution.  suspect musculoskeletal  pain   neurologic wise stable     Greater than 22 minutes of time was spent with the patient, plan of care, reviewing data, speaking to the multidisciplinary healthcare team with greater than 50% of that time in counseling and care coordination.    Thank you for the courtesy of this consultation.  
Neurology follow up note    KELSI [id number]      Interval History:    Patient feels  chest pressure     Allergies    No Known Allergies    Intolerances        MEDICATIONS    acetaminophen   Tablet .. 650 milliGRAM(s) Oral every 6 hours  ascorbic acid 500 milliGRAM(s) Oral daily  cholecalciferol 1000 Unit(s) Oral daily  enalapril 20 milliGRAM(s) Oral every 24 hours  gabapentin 100 milliGRAM(s) Oral three times a day  heparin   Injectable 5000 Unit(s) SubCutaneous every 8 hours  lidocaine   4% Patch 2 Patch Transdermal daily  methocarbamol 500 milliGRAM(s) Oral three times a day  oxyCODONE    IR 5 milliGRAM(s) Oral every 4 hours PRN  polyethylene glycol 3350 17 Gram(s) Oral two times a day  traMADol 25 milliGRAM(s) Oral every 4 hours PRN              Vital Signs Last 24 Hrs  T(C): 36.3 (16 Oct 2021 04:24), Max: 37.1 (15 Oct 2021 16:46)  T(F): 97.4 (16 Oct 2021 04:24), Max: 98.7 (15 Oct 2021 16:46)  HR: 84 (16 Oct 2021 04:24) (77 - 88)  BP: 140/78 (16 Oct 2021 04:24) (105/53 - 142/61)  BP(mean): --  RR: 18 (16 Oct 2021 04:24) (17 - 18)  SpO2: 94% (16 Oct 2021 04:24) (94% - 96%)      REVIEW OF SYSTEMS:  Constitutional:  No fever, chills, or night sweats.  Head:  No headaches.  Eyes:  No double vision or blurry vision.  Ears:  No ringing in the ears.  Neck:  Had an episode of neck pain after she had fallen, which appeared to have resolved now.  Respiratory:  No cough or shortness of breath.  Cardiovascular:  Complains of some chest pressure that radiates around to her back.  Abdomen:  No nausea, vomiting, or abdominal pain.  Extremities/Neurological:  No numbness or tingling.  Musculoskeletal:  No joint pain.  Genitourinary:  No burning upon urination.  General:  No history of seeing flashing lights or weird smell such as burning rubber.  No history upon awakening in the past of tongue bite marks or loss of urine.    PHYSICAL EXAMINATION:  HEENT:  Head:  Normocephalic, atraumatic.  Eyes:  No scleral icterus.  Ears:  Hearing bilaterally intact.  NECK:  Supple.  RESPIRATORY:  Good air entry bilaterally.  CARDIOVASCULAR:  S1 and S2 heard.  ABDOMEN:  Soft and nontender.  EXTREMITIES:  No clubbing or cyanosis was noted.      NEUROLOGIC:  The patient is awake and alert.  Extraocular movements were intact.  Speech was fluent.  Smile was symmetric.  Motor:  Bilateral upper and lower were 4+/5.  Sensory:  Bilateral upper and lower intact to light touch.  No drift.               LABS:            Hemoglobin A1C:       Vitamin B12         RADIOLOGY    ANALYSIS AND PLAN:  A 89-year-old with an episode of loss of consciousness.  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  There is no clear history to suggest underlying epilepsy and no new signs to suggest a new cerebrovascular accident has ensued.  Telemetry evaluation.  Monitor systolic blood pressure.  cardiology follow up as needed  For history of hypertension, monitor systolic blood pressure.  Fall precaution.  neurologic wise stable     Greater than 33 minutes of time was spent with the patient, plan of care, reviewing data, speaking to the multidisciplinary healthcare team with greater than 50% of that time in counseling and care coordination.    Thank you for the courtesy of this consultation.  
Neurology follow up note    KELSI [id number]      Interval History:    Patient feels constipated     Allergies    No Known Allergies    Intolerances        MEDICATIONS    acetaminophen   Tablet .. 650 milliGRAM(s) Oral every 6 hours  ascorbic acid 500 milliGRAM(s) Oral daily  cholecalciferol 1000 Unit(s) Oral daily  enalapril 10 milliGRAM(s) Oral every 24 hours  heparin   Injectable 5000 Unit(s) SubCutaneous every 8 hours  lidocaine   4% Patch 2 Patch Transdermal daily  methocarbamol 500 milliGRAM(s) Oral three times a day  polyethylene glycol 3350 17 Gram(s) Oral two times a day  traMADol 25 milliGRAM(s) Oral every 4 hours PRN              Vital Signs Last 24 Hrs  T(C): 37.1 (15 Oct 2021 04:42), Max: 37.1 (15 Oct 2021 04:42)  T(F): 98.7 (15 Oct 2021 04:42), Max: 98.7 (15 Oct 2021 04:42)  HR: 88 (15 Oct 2021 07:33) (70 - 88)  BP: 145/72 (15 Oct 2021 07:50) (116/69 - 195/92)  BP(mean): --  RR: 17 (15 Oct 2021 07:33) (17 - 18)  SpO2: 95% (15 Oct 2021 07:33) (92% - 95%)      REVIEW OF SYSTEMS:  Constitutional:  No fever, chills, or night sweats.  Head:  No headaches.  Eyes:  No double vision or blurry vision.  Ears:  No ringing in the ears.  Neck:  Had an episode of neck pain after she had fallen, which appeared to have resolved now.  Respiratory:  No cough or shortness of breath.  Cardiovascular:  Complains of some chest pressure that radiates around to her back.  Abdomen:  No nausea, vomiting, or abdominal pain.  Extremities/Neurological:  No numbness or tingling.  Musculoskeletal:  No joint pain.  Genitourinary:  No burning upon urination.  General:  No history of seeing flashing lights or weird smell such as burning rubber.  No history upon awakening in the past of tongue bite marks or loss of urine.    PHYSICAL EXAMINATION:  HEENT:  Head:  Normocephalic, atraumatic.  Eyes:  No scleral icterus.  Ears:  Hearing bilaterally intact.  NECK:  Supple.  RESPIRATORY:  Good air entry bilaterally.  CARDIOVASCULAR:  S1 and S2 heard.  ABDOMEN:  Soft and nontender.  EXTREMITIES:  No clubbing or cyanosis was noted.      NEUROLOGIC:  The patient is awake and alert.  Extraocular movements were intact.  Speech was fluent.  Smile was symmetric.  Motor:  Bilateral upper and lower were 4+/5.  Sensory:  Bilateral upper and lower intact to light touch.  No drift.               LABS:  CBC Full  -  ( 14 Oct 2021 06:52 )  WBC Count : 10.39 K/uL  RBC Count : 4.36 M/uL  Hemoglobin : 13.6 g/dL  Hematocrit : 40.3 %  Platelet Count - Automated : 228 K/uL  Mean Cell Volume : 92.4 fl  Mean Cell Hemoglobin : 31.2 pg  Mean Cell Hemoglobin Concentration : 33.7 gm/dL  Auto Neutrophil # : x  Auto Lymphocyte # : x  Auto Monocyte # : x  Auto Eosinophil # : x  Auto Basophil # : x  Auto Neutrophil % : x  Auto Lymphocyte % : x  Auto Monocyte % : x  Auto Eosinophil % : x  Auto Basophil % : x      10-14    140  |  109<H>  |  29<H>  ----------------------------<  99  3.7   |  25  |  0.89    Ca    9.0      14 Oct 2021 06:52      Hemoglobin A1C:       Vitamin B12         RADIOLOGY    ANALYSIS AND PLAN:  A 89-year-old with an episode of loss of consciousness.  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  There is no clear history to suggest underlying epilepsy and no new signs to suggest a new cerebrovascular accident has ensued.  Telemetry evaluation.  Monitor systolic blood pressure.  cardiology follow up as needed  For history of hypertension, monitor systolic blood pressure.  Fall precaution.  monitor stool out put     Greater than 40 minutes of time was spent with the patient, plan of care, reviewing data, speaking to the multidisciplinary healthcare team with greater than 50% of that time in counseling and care coordination.    Thank you for the courtesy of this consultation.  
Neurology follow up note    KELSI [id number]      Interval History:    Patient feels ok no new complaints.    Allergies    No Known Allergies    Intolerances        MEDICATIONS    acetaminophen   Tablet .. 650 milliGRAM(s) Oral every 6 hours  ascorbic acid 500 milliGRAM(s) Oral daily  cholecalciferol 1000 Unit(s) Oral daily  enalapril 20 milliGRAM(s) Oral every 24 hours  gabapentin 100 milliGRAM(s) Oral three times a day  heparin   Injectable 5000 Unit(s) SubCutaneous every 8 hours  lidocaine   4% Patch 2 Patch Transdermal daily  methocarbamol 500 milliGRAM(s) Oral three times a day  oxyCODONE    IR 5 milliGRAM(s) Oral every 4 hours PRN  polyethylene glycol 3350 17 Gram(s) Oral two times a day  traMADol 25 milliGRAM(s) Oral every 4 hours PRN              Vital Signs Last 24 Hrs  T(C): 36.4 (17 Oct 2021 04:48), Max: 37 (16 Oct 2021 11:39)  T(F): 97.6 (17 Oct 2021 04:48), Max: 98.6 (16 Oct 2021 11:39)  HR: 80 (17 Oct 2021 04:48) (78 - 80)  BP: 154/82 (17 Oct 2021 04:48) (112/46 - 154/82)  BP(mean): --  RR: 18 (17 Oct 2021 04:48) (18 - 18)  SpO2: 95% (17 Oct 2021 04:48) (93% - 97%)    REVIEW OF SYSTEMS:  Constitutional:  No fever, chills, or night sweats.  Head:  No headaches.  Eyes:  No double vision or blurry vision.  Ears:  No ringing in the ears.  Neck:  Had an episode of neck pain after she had fallen, which appeared to have resolved now.  Respiratory:  No cough or shortness of breath.  Cardiovascular:  Complains of some chest pressure that radiates around to her back.  Abdomen:  No nausea, vomiting, or abdominal pain.  Extremities/Neurological:  No numbness or tingling.  Musculoskeletal:  No joint pain.  Genitourinary:  No burning upon urination.  General:  No history of seeing flashing lights or weird smell such as burning rubber.  No history upon awakening in the past of tongue bite marks or loss of urine.    PHYSICAL EXAMINATION:  HEENT:  Head:  Normocephalic, atraumatic.  Eyes:  No scleral icterus.  Ears:  Hearing bilaterally intact.  NECK:  Supple.  RESPIRATORY:  Good air entry bilaterally.  CARDIOVASCULAR:  S1 and S2 heard.  ABDOMEN:  Soft and nontender.  EXTREMITIES:  No clubbing or cyanosis was noted.      NEUROLOGIC:  The patient is awake and alert.  Extraocular movements were intact.  Speech was fluent.  Smile was symmetric.  Motor:  Bilateral upper and lower were 4+/5.  Sensory:  Bilateral upper and lower intact to light touch.  No drift.                 LABS:            Hemoglobin A1C:       Vitamin B12         RADIOLOGY      ANALYSIS AND PLAN:  A 89-year-old with an episode of loss of consciousness.  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  There is no clear history to suggest underlying epilepsy and no new signs to suggest a new cerebrovascular accident has ensued.  Telemetry evaluation.  Monitor systolic blood pressure.  cardiology follow up as needed  For history of hypertension, monitor systolic blood pressure.  Fall precaution.  neurologic wise stable     Greater than 27 minutes of time was spent with the patient, plan of care, reviewing data, speaking to the multidisciplinary healthcare team with greater than 50% of that time in counseling and care coordination.    Thank you for the courtesy of this consultation.    
Neurology follow up note    KELSI [id number]      Interval History:    Patient feels better less pain     Allergies    No Known Allergies    Intolerances        MEDICATIONS    acetaminophen   Tablet .. 650 milliGRAM(s) Oral every 6 hours  amLODIPine   Tablet 10 milliGRAM(s) Oral daily  ascorbic acid 500 milliGRAM(s) Oral daily  bisacodyl 5 milliGRAM(s) Oral every 12 hours PRN  cholecalciferol 1000 Unit(s) Oral daily  gabapentin 300 milliGRAM(s) Oral three times a day  heparin   Injectable 5000 Unit(s) SubCutaneous every 8 hours  lidocaine   4% Patch 2 Patch Transdermal daily  methocarbamol 500 milliGRAM(s) Oral three times a day  oxyCODONE    IR 5 milliGRAM(s) Oral every 4 hours PRN  polyethylene glycol 3350 17 Gram(s) Oral two times a day  psyllium Powder 1 Packet(s) Oral daily  senna 2 Tablet(s) Oral at bedtime  traMADol 25 milliGRAM(s) Oral every 4 hours PRN              Vital Signs Last 24 Hrs  T(C): 36.5 (19 Oct 2021 05:40), Max: 36.6 (18 Oct 2021 11:26)  T(F): 97.7 (19 Oct 2021 05:40), Max: 97.9 (18 Oct 2021 11:26)  HR: 79 (19 Oct 2021 05:40) (71 - 82)  BP: 135/72 (19 Oct 2021 05:40) (135/72 - 149/77)  BP(mean): --  RR: 18 (19 Oct 2021 05:40) (17 - 18)  SpO2: 93% (19 Oct 2021 05:40) (93% - 95%)      REVIEW OF SYSTEMS:  Constitutional:  No fever, chills, or night sweats.  Head:  No headaches.  Eyes:  No double vision or blurry vision.  Ears:  No ringing in the ears.  Neck:  Had an episode of neck pain after she had fallen, which appeared to have resolved now.  Respiratory:  No cough or shortness of breath.  Cardiovascular:  Complains of some chest pressure that radiates around to her back.  Abdomen:  No nausea, vomiting, or abdominal pain.  Extremities/Neurological:  No numbness or tingling.  Musculoskeletal:  No joint pain.  Genitourinary:  No burning upon urination.  General:  No history of seeing flashing lights or weird smell such as burning rubber.  No history upon awakening in the past of tongue bite marks or loss of urine.    PHYSICAL EXAMINATION:  HEENT:  Head:  Normocephalic, atraumatic.  Eyes:  No scleral icterus.  Ears:  Hearing bilaterally intact.  NECK:  Supple.  RESPIRATORY:  Good air entry bilaterally.  CARDIOVASCULAR:  S1 and S2 heard.  ABDOMEN:  Soft and nontender.  EXTREMITIES:  No clubbing or cyanosis was noted.      NEUROLOGIC:  The patient is awake and alert.  Extraocular movements were intact.  Speech was fluent.  Smile was symmetric.  Motor:  Bilateral upper and lower were 4+/5.  Sensory:  Bilateral upper and lower intact to light touch.  No drift.               LABS:  CBC Full  -  ( 19 Oct 2021 07:49 )  WBC Count : 8.57 K/uL  RBC Count : 4.18 M/uL  Hemoglobin : 12.9 g/dL  Hematocrit : 40.1 %  Platelet Count - Automated : 255 K/uL  Mean Cell Volume : 95.9 fl  Mean Cell Hemoglobin : 30.9 pg  Mean Cell Hemoglobin Concentration : 32.2 gm/dL  Auto Neutrophil # : x  Auto Lymphocyte # : x  Auto Monocyte # : x  Auto Eosinophil # : x  Auto Basophil # : x  Auto Neutrophil % : x  Auto Lymphocyte % : x  Auto Monocyte % : x  Auto Eosinophil % : x  Auto Basophil % : x      10-19    141  |  109<H>  |  30<H>  ----------------------------<  120<H>  5.4<H>   |  27  |  1.00    Ca    9.3      19 Oct 2021 07:49      Hemoglobin A1C:       Vitamin B12         RADIOLOGY      ANALYSIS AND PLAN:  A 89-year-old with an episode of loss of consciousness.  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  There is no clear history to suggest underlying epilepsy and no new signs to suggest a new cerebrovascular accident has ensued.  Telemetry evaluation.  Monitor systolic blood pressure.  cardiology follow up as needed  For history of hypertension, monitor systolic blood pressure.  Fall precaution.  suspect musculoskeletal  pain overall better  neurologic wise stable     Greater than 22 minutes of time was spent with the patient, plan of care, reviewing data, speaking to the multidisciplinary healthcare team with greater than 50% of that time in counseling and care coordination.    Thank you for the courtesy of this consultation.
Patient is a 89y old  Female who presents with a chief complaint of fall    INTERVAL /OVERNIGHT EVENTS:    MEDICATIONS  (STANDING):  acetaminophen   Tablet .. 650 milliGRAM(s) Oral every 6 hours  ascorbic acid 500 milliGRAM(s) Oral daily  aspirin enteric coated 81 milliGRAM(s) Oral daily  cholecalciferol 1000 Unit(s) Oral daily  enalapril 10 milliGRAM(s) Oral every 24 hours  heparin   Injectable 5000 Unit(s) SubCutaneous every 8 hours  lidocaine   4% Patch 2 Patch Transdermal daily  methocarbamol 500 milliGRAM(s) Oral three times a day  polyethylene glycol 3350 17 Gram(s) Oral two times a day    MEDICATIONS  (PRN):  traMADol 25 milliGRAM(s) Oral every 4 hours PRN Moderate Pain (4 - 6)      Allergies    No Known Allergies    Intolerances        REVIEW OF SYSTEMS:  CONSTITUTIONAL: No fever, weight loss, or fatigue  EYES: No eye pain, visual disturbances, or discharge  ENMT:  No difficulty hearing, tinnitus, vertigo; No sinus or throat pain  NECK: No pain or stiffness  RESPIRATORY: No cough, wheezing, chills or hemoptysis; No shortness of breath  CARDIOVASCULAR: No chest pain, palpitations, dizziness, or leg swelling  GASTROINTESTINAL: No abdominal or epigastric pain. No nausea, vomiting, or hematemesis; No diarrhea or constipation. No melena or hematochezia.  GENITOURINARY: No dysuria, frequency, hematuria, or incontinence  NEUROLOGICAL: No headaches, memory loss, loss of strength, numbness, or tremors  SKIN: No itching, burning, rashes, or lesions   LYMPH NODES: No enlarged glands  ENDOCRINE: No heat or cold intolerance; No hair loss; No polydipsia or polyuria  MUSCULOSKELETAL: No joint pain or swelling; No muscle, back, or extremity pain  PSYCHIATRIC: No depression, anxiety, mood swings, or difficulty sleeping  HEME/LYMPH: No easy bruising, or bleeding gums  ALLERGY AND IMMUNOLOGIC: No hives or eczema    Vital Signs Last 24 Hrs  T(C): 36.7 (14 Oct 2021 10:34), Max: 36.7 (13 Oct 2021 19:28)  T(F): 98 (14 Oct 2021 10:34), Max: 98.1 (13 Oct 2021 19:28)  HR: 82 (14 Oct 2021 12:06) (65 - 82)  BP: 139/78 (14 Oct 2021 12:06) (139/78 - 171/85)  BP(mean): --  RR: 18 (14 Oct 2021 12:06) (18 - 18)  SpO2: 92% (14 Oct 2021 12:06) (92% - 95%)    PHYSICAL EXAM:  GENERAL: NAD, well-groomed, well-developed  HEAD:  Atraumatic, Normocephalic  EYES: EOMI, PERRLA, conjunctiva and sclera clear  ENMT: No tonsillar erythema, exudates, or enlargement; Moist mucous membranes, Good dentition, No lesions  NECK: Supple, No JVD, Normal thyroid  NERVOUS SYSTEM:  Alert & Oriented X3, Good concentration; Motor Strength 5/5 B/L upper and lower extremities; DTRs 2+ intact and symmetric  CHEST/LUNG: Clear to auscultation bilaterally; No rales, rhonchi, wheezing, or rubs  HEART: Regular rate and rhythm; No murmurs, rubs, or gallops  ABDOMEN: Soft, Nontender, Nondistended; Bowel sounds present  EXTREMITIES:  2+ Peripheral Pulses, No clubbing, cyanosis, or edema  LYMPH: No lymphadenopathy noted  SKIN: No rashes or lesions    LABS:                        13.6   10.39 )-----------( 228      ( 14 Oct 2021 06:52 )             40.3     14 Oct 2021 06:52    140    |  109    |  29     ----------------------------<  99     3.7     |  25     |  0.89     Ca    9.0        14 Oct 2021 06:52          CAPILLARY BLOOD GLUCOSE          RADIOLOGY & ADDITIONAL TESTS:    Notes Reviewed:  [x ] YES  [ ] NO    Care Discussed with Consultants/Other Providers [x ] YES  [ ] NO
[facility] -- Ted Sloan, Ashish, Shalonda, Juan Pablo Boone Savella  Office # [phone]      Follow Up:  syncope    Subjective/Observations:   No events overnight resting comfortably in bed.  No complaints of chest pain, dyspnea, or palpitations reported. No signs of orthopnea or PND.      REVIEW OF SYSTEMS: All other review of systems is negative unless indicated above    PAST MEDICAL & SURGICAL HISTORY:  HTN (hypertension)    Glaucoma    No significant past surgical history        MEDICATIONS  (STANDING):  acetaminophen   Tablet .. 650 milliGRAM(s) Oral every 6 hours  ascorbic acid 500 milliGRAM(s) Oral daily  cholecalciferol 1000 Unit(s) Oral daily  enalapril 20 milliGRAM(s) Oral every 24 hours  gabapentin 100 milliGRAM(s) Oral three times a day  heparin   Injectable 5000 Unit(s) SubCutaneous every 8 hours  lidocaine   4% Patch 2 Patch Transdermal daily  methocarbamol 500 milliGRAM(s) Oral three times a day  polyethylene glycol 3350 17 Gram(s) Oral two times a day    MEDICATIONS  (PRN):  oxyCODONE    IR 5 milliGRAM(s) Oral every 4 hours PRN Severe Pain (7 - 10)  traMADol 25 milliGRAM(s) Oral every 4 hours PRN Moderate Pain (4 - 6)      Allergies    No Known Allergies    Intolerances        Vital Signs Last 24 Hrs  T(C): 36.2 (15 Oct 2021 11:35), Max: 37.1 (15 Oct 2021 04:42)  T(F): 97.2 (15 Oct 2021 11:35), Max: 98.7 (15 Oct 2021 04:42)  HR: 88 (15 Oct 2021 11:35) (70 - 88)  BP: 142/61 (15 Oct 2021 11:35) (116/69 - 195/92)  BP(mean): --  RR: 17 (15 Oct 2021 11:35) (17 - 18)  SpO2: 95% (15 Oct 2021 11:35) (92% - 95%)    I&O's Summary    14 Oct 2021 07:01  -  15 Oct 2021 07:00  --------------------------------------------------------  IN: 400 mL / OUT: 2000 mL / NET: -1600 mL          PHYSICAL EXAM:  TELE: SR 85   Constitutional: NAD, awake and alert, well-developed  HEENT: Moist Mucous Membranes, Anicteric  Pulmonary: Non-labored, breath sounds are clear bilaterally, No wheezing, crackles or rhonchi  Cardiovascular: Regular, S1 and S2 nl, No murmurs, rubs, gallops or clicks  Gastrointestinal: Bowel Sounds present, soft, nontender.   Lymph: No lymphadenopathy. No peripheral edema.  Skin: No visible rashes or ulcers.  Psych:  Mood & affect appropriate    LABS: All Labs Reviewed:                        13.6   10.39 )-----------( 228      ( 14 Oct 2021 06:52 )             40.3                         13.8   11.14 )-----------( 219      ( 13 Oct 2021 07:51 )             41.6     14 Oct 2021 06:52    140    |  109    |  29     ----------------------------<  99     3.7     |  25     |  0.89   13 Oct 2021 07:51    138    |  104    |  34     ----------------------------<  91     4.2     |  27     |  0.96     Ca    9.0        14 Oct 2021 06:52  Ca    9.6        13 Oct 2021 07:51    TPro  7.4    /  Alb  3.4    /  TBili  1.1    /  DBili  x      /  AST  91     /  ALT  73     /  AlkPhos  84     13 Oct 2021 07:51        EXAM:  CT CHEST IC                          EXAM:  CT ABDOMEN AND PELVIS IC                            PROCEDURE DATE:  10/12/2021          INTERPRETATION:  CLINICAL INFORMATION: Syncope, fall.    COMPARISON: None.    CONTRAST/COMPLICATIONS:  IV Contrast: IV contrast documented in associated exam  Oral Contrast: NONE  Complications: None reported at time of study completion    PROCEDURE:  CT of the Chest, Abdomen and Pelvis was performed.  Sagittal and coronal reformats were performed.    FINDINGS:  CHEST:  LUNGS AND LARGE AIRWAYS: Patent central airways. Mild linear atelectasis in the left lower lobe and in the inferior lingula. There is scattered mucous plugging at the lung apices.  PLEURA: No pleural effusion. No pneumothorax or pneumomediastinum.  VESSELS: Within normal limits.  HEART: Heart size is normal. No pericardial effusion.  MEDIASTINUM AND CARLA: No lymphadenopathy.  CHEST WALL AND LOWER NECK: Within normal limits.    ABDOMEN AND PELVIS:  LIVER: Within normal limits.  BILE DUCTS: There is moderate intra and extrahepatic biliary dilatation extending to the ampulla..  GALLBLADDER: Removed.  SPLEEN: Within normal limits.  PANCREAS: The pancreas is mildly atrophic. There is prominence of the pancreatic duct within the head and neck to 5 mm. There is no peripancreatic stranding or fluid collection.  ADRENALS: Within normal limits.  KIDNEYS/URETERS: Within normal limits.    BLADDER: Within normal limits.  REPRODUCTIVE ORGANS: Uterus and adnexa within normal limits.    BOWEL: Sigmoid diverticulosis. No bowel obstruction. Appendix is normal.  PERITONEUM: No ascites.  VESSELS: Within normal limits.  RETROPERITONEUM/LYMPH NODES: No lymphadenopathy.  ABDOMINAL WALL: Within normal limits.  BONES: Their is a chronic appearingsevere compression deformity of T10.    IMPRESSION: No evidence of acute visceral organ or bony injury. Intra and extrahepatic biliary dilatation and pancreatic ductal dilatation, likely related to the patient's stated age and a postcholecystectomy reservoir effect.    --- End of Report ---  CANDIDO DURAN MD; Attending Radiologist  This document has been electronically signed. Oct 12 2021 12:56PM    Ventricular Rate 75 BPM    Atrial Rate 75 BPM    P-R Interval 218 ms    QRS Duration 82 ms    Q-T Interval 394 ms    QTC Calculation(Bazett) 439 ms    P Axis 42 degrees    R Axis -28 degrees    T Axis 57 degrees    Diagnosis Line Sinus rhythm with 1st degree AV block  Voltage criteria for left ventricular hypertrophy  Abnormal ECG  When compared with ECG of 14-NOV-2008 18:14,  QRS axis shifted left  
[facility] -- Ted Sloan, Ashish, Shalonda, Juan Pablo Boone Savella, [place]  Office # [phone]    Follow Up:  Syncope    Subjective/Observations: Awake and alert, comfortable on RA.  Denies any form of respiratory or cardiac discomfort.  However, c/o abdominal pain radiating to her back    REVIEW OF SYSTEMS: All other review of systems is negative unless indicated above  PAST MEDICAL & SURGICAL HISTORY:  HTN (hypertension)    Glaucoma    MEDICATIONS  (STANDING):  acetaminophen   Tablet .. 650 milliGRAM(s) Oral every 6 hours  ascorbic acid 500 milliGRAM(s) Oral daily  aspirin enteric coated 81 milliGRAM(s) Oral daily  cholecalciferol 1000 Unit(s) Oral daily  enalapril 5 milliGRAM(s) Oral daily  heparin   Injectable 5000 Unit(s) SubCutaneous every 12 hours  lidocaine   4% Patch 2 Patch Transdermal daily  methocarbamol 500 milliGRAM(s) Oral three times a day  polyethylene glycol 3350 17 Gram(s) Oral two times a day  sodium chloride 0.9%. 1000 milliLiter(s) (50 mL/Hr) IV Continuous <Continuous>    MEDICATIONS  (PRN):  traMADol 25 milliGRAM(s) Oral every 4 hours PRN Moderate Pain (4 - 6)    Allergies    No Known Allergies    Intolerances    Vital Signs Last 24 Hrs  T(C): 36.5 (14 Oct 2021 05:10), Max: 36.9 (13 Oct 2021 11:10)  T(F): 97.7 (14 Oct 2021 05:10), Max: 98.4 (13 Oct 2021 11:10)  HR: 73 (14 Oct 2021 05:10) (65 - 94)  BP: 171/85 (14 Oct 2021 05:10) (117/70 - 171/85)  BP(mean): --  RR: 18 (14 Oct 2021 05:10) (16 - 18)  SpO2: 92% (14 Oct 2021 05:10) (92% - 98%)  I&O's Summary    13 Oct 2021 07:01  -  14 Oct 2021 07:00  --------------------------------------------------------  IN: 900 mL / OUT: 1300 mL / NET: -400 mL     PHYSICAL EXAM:  TELE: Not on tele  Constitutional: NAD, awake and alert, well-developed  HEENT: Moist Mucous Membranes, Anicteric  Pulmonary: Non-labored, breath sounds are clear bilaterally, No wheezing, rales or rhonchi  Cardiovascular: Regular, S1 and S2, No murmurs, rubs, gallops or clicks  Gastrointestinal: Bowel Sounds present, soft, nontender.   Lymph: No peripheral edema. No lymphadenopathy.  Skin: No visible rashes or ulcers.  Psych:  Mood & affect: Flat  LABS: All Labs Reviewed:                        13.6   10.39 )-----------( 228      ( 14 Oct 2021 06:52 )             40.3                         13.8   11.14 )-----------( 219      ( 13 Oct 2021 07:51 )             41.6                         14.1   13.61 )-----------( 248      ( 12 Oct 2021 11:21 )             42.7     14 Oct 2021 06:52    140    |  109    |  29     ----------------------------<  99     3.7     |  25     |  0.89   13 Oct 2021 07:51    138    |  104    |  34     ----------------------------<  91     4.2     |  27     |  0.96   12 Oct 2021 11:21    138    |  106    |  37     ----------------------------<  144    4.7     |  29     |  1.10     Ca    9.0        14 Oct 2021 06:52  Ca    9.6        13 Oct 2021 07:51  Ca    10.1       12 Oct 2021 11:21    TPro  7.4    /  Alb  3.4    /  TBili  1.1    /  DBili  x      /  AST  91     /  ALT  73     /  AlkPhos  84     13 Oct 2021 07:51  TPro  8.0    /  Alb  3.5    /  TBili  0.5    /  DBili  x      /  AST  40     /  ALT  29     /  AlkPhos  71     12 Oct 2021 11:21    PT/INR - ( 12 Oct 2021 11:21 )   PT: 11.9 sec;   INR: 1.02 ratio      PTT - ( 12 Oct 2021 11:21 )  PTT:28.0 sec  CARDIAC MARKERS ( 12 Oct 2021 17:36 )  x     / x     / x     / x     / 2.4 ng/mL  CARDIAC MARKERS ( 12 Oct 2021 17:33 )  <.015 ng/mL / x     / 83 U/L / x     / x      CARDIAC MARKERS ( 12 Oct 2021 11:21 )  <.015 ng/mL / x     / x     / x     / x        TTE pending       EXAM:  CT CHEST IC                          EXAM:  CT ABDOMEN AND PELVIS IC                            PROCEDURE DATE:  10/12/2021          INTERPRETATION:  CLINICAL INFORMATION: Syncope, fall.    COMPARISON: None.    CONTRAST/COMPLICATIONS:  IV Contrast: IV contrast documented in associated exam  Oral Contrast: NONE  Complications: None reported at time of study completion    PROCEDURE:  CT of the Chest, Abdomen and Pelvis was performed.  Sagittal and coronal reformats were performed.    FINDINGS:  CHEST:  LUNGS AND LARGE AIRWAYS: Patent central airways. Mild linear atelectasis in the left lower lobe and in the inferior lingula. There is scattered mucous plugging at the lung apices.  PLEURA: No pleural effusion. No pneumothorax or pneumomediastinum.  VESSELS: Within normal limits.  HEART: Heart size is normal. No pericardial effusion.  MEDIASTINUM AND CARLA: No lymphadenopathy.  CHEST WALL AND LOWER NECK: Within normal limits.    ABDOMEN AND PELVIS:  LIVER: Within normal limits.  BILE DUCTS: There is moderate intra and extrahepatic biliary dilatation extending to the ampulla..  GALLBLADDER: Removed.  SPLEEN: Within normal limits.  PANCREAS: The pancreas is mildly atrophic. There is prominence of the pancreatic duct within the head and neck to 5 mm. There is no peripancreatic stranding or fluid collection.  ADRENALS: Within normal limits.  KIDNEYS/URETERS: Within normal limits.    BLADDER: Within normal limits.  REPRODUCTIVE ORGANS: Uterus and adnexa within normal limits.    BOWEL: Sigmoid diverticulosis. No bowel obstruction. Appendix is normal.  PERITONEUM: No ascites.  VESSELS: Within normal limits.  RETROPERITONEUM/LYMPH NODES: No lymphadenopathy.  ABDOMINAL WALL: Within normal limits.  BONES: Their is a chronic appearingsevere compression deformity of T10.    IMPRESSION: No evidence of acute visceral organ or bony injury. Intra and extrahepatic biliary dilatation and pancreatic ductal dilatation, likely related to the patient's stated age and a postcholecystectomy reservoir effect.    --- End of Report ---  CANDIDO DURAN MD; Attending Radiologist  This document has been electronically signed. Oct 12 2021 12:56PM    Ventricular Rate 75 BPM    Atrial Rate 75 BPM    P-R Interval 218 ms    QRS Duration 82 ms    Q-T Interval 394 ms    QTC Calculation(Bazett) 439 ms    P Axis 42 degrees    R Axis -28 degrees    T Axis 57 degrees    Diagnosis Line Sinus rhythm with 1st degree AV block  Voltage criteria for left ventricular hypertrophy  Abnormal ECG  When compared with ECG of 14-NOV-2008 18:14,  QRS axis shifted left  Confirmed by Dash Liz MD ([id number]) on 10/12/2021 1:57:15 PM  
For episode of loss of consciousness, suspect most likely this was syncopal event secondary dehydration and bowel movement.  There is no clear signs on examination to suggest a new cerebrovascular accident has ensued and there is no history to suggest underlying epilepsy.  tele eval   monitor sbp   neurologic wise stable dc planning     
Neurology follow up note    KELSI [id number]      Interval History:    Patient feels chest pressure     Allergies    No Known Allergies    Intolerances        MEDICATIONS    acetaminophen   Tablet .. 650 milliGRAM(s) Oral every 6 hours  ascorbic acid 500 milliGRAM(s) Oral daily  aspirin enteric coated 81 milliGRAM(s) Oral daily  cholecalciferol 1000 Unit(s) Oral daily  enalapril 5 milliGRAM(s) Oral daily  heparin   Injectable 5000 Unit(s) SubCutaneous every 12 hours  lidocaine   4% Patch 2 Patch Transdermal daily  methocarbamol 500 milliGRAM(s) Oral three times a day  polyethylene glycol 3350 17 Gram(s) Oral two times a day  sodium chloride 0.9%. 1000 milliLiter(s) IV Continuous <Continuous>  traMADol 25 milliGRAM(s) Oral every 4 hours PRN              Vital Signs Last 24 Hrs  T(C): 36.5 (14 Oct 2021 05:10), Max: 36.9 (13 Oct 2021 11:10)  T(F): 97.7 (14 Oct 2021 05:10), Max: 98.4 (13 Oct 2021 11:10)  HR: 73 (14 Oct 2021 05:10) (65 - 94)  BP: 171/85 (14 Oct 2021 05:10) (117/70 - 171/85)  BP(mean): --  RR: 18 (14 Oct 2021 05:10) (16 - 18)  SpO2: 92% (14 Oct 2021 05:10) (92% - 98%)      REVIEW OF SYSTEMS:  Constitutional:  No fever, chills, or night sweats.  Head:  No headaches.  Eyes:  No double vision or blurry vision.  Ears:  No ringing in the ears.  Neck:  Had an episode of neck pain after she had fallen, which appeared to have resolved now.  Respiratory:  No cough or shortness of breath.  Cardiovascular:  Complains of some chest pressure that radiates around to her back.  Abdomen:  No nausea, vomiting, or abdominal pain.  Extremities/Neurological:  No numbness or tingling.  Musculoskeletal:  No joint pain.  Genitourinary:  No burning upon urination.  General:  No history of seeing flashing lights or weird smell such as burning rubber.  No history upon awakening in the past of tongue bite marks or loss of urine.    PHYSICAL EXAMINATION:  HEENT:  Head:  Normocephalic, atraumatic.  Eyes:  No scleral icterus.  Ears:  Hearing bilaterally intact.  NECK:  Supple.  RESPIRATORY:  Good air entry bilaterally.  CARDIOVASCULAR:  S1 and S2 heard.  ABDOMEN:  Soft and nontender.  EXTREMITIES:  No clubbing or cyanosis was noted.      NEUROLOGIC:  The patient is awake and alert.  Extraocular movements were intact.  Speech was fluent.  Smile was symmetric.  Motor:  Bilateral upper and lower were 4+/5.  Sensory:  Bilateral upper and lower intact to light touch.  No drift.            LABS:  CBC Full  -  ( 14 Oct 2021 06:52 )  WBC Count : 10.39 K/uL  RBC Count : 4.36 M/uL  Hemoglobin : 13.6 g/dL  Hematocrit : 40.3 %  Platelet Count - Automated : 228 K/uL  Mean Cell Volume : 92.4 fl  Mean Cell Hemoglobin : 31.2 pg  Mean Cell Hemoglobin Concentration : 33.7 gm/dL  Auto Neutrophil # : x  Auto Lymphocyte # : x  Auto Monocyte # : x  Auto Eosinophil # : x  Auto Basophil # : x  Auto Neutrophil % : x  Auto Lymphocyte % : x  Auto Monocyte % : x  Auto Eosinophil % : x  Auto Basophil % : x      10-14    140  |  109<H>  |  29<H>  ----------------------------<  99  3.7   |  25  |  0.89    Ca    9.0      14 Oct 2021 06:52    TPro  7.4  /  Alb  3.4  /  TBili  1.1  /  DBili  x   /  AST  91<H>  /  ALT  73  /  AlkPhos  84  10-13    Hemoglobin A1C:   Lipid Panel 10-13 @ 13:00  Total Cholesterol, Serum 244  LDL --  Triglycerides 109    LIVER FUNCTIONS - ( 13 Oct 2021 07:51 )  Alb: 3.4 g/dL / Pro: 7.4 g/dL / ALK PHOS: 84 U/L / ALT: 73 U/L / AST: 91 U/L / GGT: x           Vitamin B12   PT/INR - ( 12 Oct 2021 11:21 )   PT: 11.9 sec;   INR: 1.02 ratio         PTT - ( 12 Oct 2021 11:21 )  PTT:28.0 sec      RADIOLOGY      ANALYSIS AND PLAN:  A 89-year-old with an episode of loss of consciousness.  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  There is no clear history to suggest underlying epilepsy and no new signs to suggest a new cerebrovascular accident has ensued.  Telemetry evaluation.  Monitor systolic blood pressure.  cardiology follow up as needed  For history of hypertension, monitor systolic blood pressure.  Fall precaution.    Greater than 40 minutes of time was spent with the patient, plan of care, reviewing data, speaking to the multidisciplinary healthcare team with greater than 50% of that time in counseling and care coordination.    Thank you for the courtesy of this consultation.  
Patient is a 89y old  Female who presents with a chief complaint of syncope (15 Oct 2021 13:05)      INTERVAL /OVERNIGHT EVENTS: still c/o pain    MEDICATIONS  (STANDING):  acetaminophen   Tablet .. 650 milliGRAM(s) Oral every 6 hours  ascorbic acid 500 milliGRAM(s) Oral daily  cholecalciferol 1000 Unit(s) Oral daily  enalapril 20 milliGRAM(s) Oral every 24 hours  gabapentin 100 milliGRAM(s) Oral three times a day  heparin   Injectable 5000 Unit(s) SubCutaneous every 8 hours  lidocaine   4% Patch 2 Patch Transdermal daily  methocarbamol 500 milliGRAM(s) Oral three times a day  polyethylene glycol 3350 17 Gram(s) Oral two times a day    MEDICATIONS  (PRN):  oxyCODONE    IR 5 milliGRAM(s) Oral every 4 hours PRN Severe Pain (7 - 10)  traMADol 25 milliGRAM(s) Oral every 4 hours PRN Moderate Pain (4 - 6)      Allergies    No Known Allergies    Intolerances        REVIEW OF SYSTEMS:  CONSTITUTIONAL: No fever, weight loss, or fatigue  EYES: No eye pain, visual disturbances, or discharge  ENMT:  No difficulty hearing, tinnitus, vertigo; No sinus or throat pain  NECK: No pain or stiffness  RESPIRATORY: No cough, wheezing, chills or hemoptysis; No shortness of breath  CARDIOVASCULAR: No chest pain, palpitations, dizziness, or leg swelling  GASTROINTESTINAL: No abdominal or epigastric pain. No nausea, vomiting, or hematemesis; No diarrhea or constipation. No melena or hematochezia.  GENITOURINARY: No dysuria, frequency, hematuria, or incontinence  NEUROLOGICAL: No headaches, memory loss, loss of strength, numbness, or tremors  SKIN: No itching, burning, rashes, or lesions   LYMPH NODES: No enlarged glands  ENDOCRINE: No heat or cold intolerance; No hair loss; No polydipsia or polyuria  MUSCULOSKELETAL: No joint pain or swelling; No muscle, back, or extremity pain  PSYCHIATRIC: No depression, anxiety, mood swings, or difficulty sleeping  HEME/LYMPH: No easy bruising, or bleeding gums  ALLERGY AND IMMUNOLOGIC: No hives or eczema    Vital Signs Last 24 Hrs  T(C): 37.1 (15 Oct 2021 16:46), Max: 37.1 (15 Oct 2021 04:42)  T(F): 98.7 (15 Oct 2021 16:46), Max: 98.7 (15 Oct 2021 04:42)  HR: 77 (15 Oct 2021 16:46) (70 - 88)  BP: 105/53 (15 Oct 2021 16:46) (105/53 - 195/92)  BP(mean): --  RR: 18 (15 Oct 2021 16:46) (17 - 18)  SpO2: 96% (15 Oct 2021 16:46) (92% - 96%)    PHYSICAL EXAM:  GENERAL: NAD, well-groomed, well-developed  HEAD:  Atraumatic, Normocephalic  EYES: EOMI, PERRLA, conjunctiva and sclera clear  ENMT: No tonsillar erythema, exudates, or enlargement; Moist mucous membranes, Good dentition, No lesions  NECK: Supple, No JVD, Normal thyroid  NERVOUS SYSTEM:  Alert & Oriented X3, Good concentration; Motor Strength 5/5 B/L upper and lower extremities; DTRs 2+ intact and symmetric  CHEST/LUNG: Clear to auscultation bilaterally; No rales, rhonchi, wheezing, or rubs  HEART: Regular rate and rhythm; No murmurs, rubs, or gallops  ABDOMEN: Soft, Nontender, Nondistended; Bowel sounds present  EXTREMITIES:  2+ Peripheral Pulses, No clubbing, cyanosis, or edema  LYMPH: No lymphadenopathy noted  SKIN: No rashes or lesions    LABS:      Ca    9.0        14 Oct 2021 06:52          CAPILLARY BLOOD GLUCOSE          RADIOLOGY & ADDITIONAL TESTS:    Notes Reviewed:  [x ] YES  [ ] NO    Care Discussed with Consultants/Other Providers [x ] YES  [ ] NO
Patient is a 89y old  Female who presents with a chief complaint of syncope (17 Oct 2021 08:32)      INTERVAL /OVERNIGHT EVENTS: c/o back pain    MEDICATIONS  (STANDING):  acetaminophen   Tablet .. 650 milliGRAM(s) Oral every 6 hours  ascorbic acid 500 milliGRAM(s) Oral daily  cholecalciferol 1000 Unit(s) Oral daily  enalapril 20 milliGRAM(s) Oral every 24 hours  gabapentin 300 milliGRAM(s) Oral three times a day  heparin   Injectable 5000 Unit(s) SubCutaneous every 8 hours  lidocaine   4% Patch 2 Patch Transdermal daily  methocarbamol 500 milliGRAM(s) Oral three times a day  polyethylene glycol 3350 17 Gram(s) Oral two times a day    MEDICATIONS  (PRN):  oxyCODONE    IR 5 milliGRAM(s) Oral every 4 hours PRN Severe Pain (7 - 10)  traMADol 25 milliGRAM(s) Oral every 4 hours PRN Moderate Pain (4 - 6)      Allergies    No Known Allergies    Intolerances        REVIEW OF SYSTEMS:  CONSTITUTIONAL: No fever, weight loss, or fatigue  EYES: No eye pain, visual disturbances, or discharge  ENMT:  No difficulty hearing, tinnitus, vertigo; No sinus or throat pain  NECK: No pain or stiffness  RESPIRATORY: No cough, wheezing, chills or hemoptysis; No shortness of breath  CARDIOVASCULAR: No chest pain, palpitations, dizziness, or leg swelling  GASTROINTESTINAL: No abdominal or epigastric pain. No nausea, vomiting, or hematemesis; No diarrhea or constipation. No melena or hematochezia.  GENITOURINARY: No dysuria, frequency, hematuria, or incontinence  NEUROLOGICAL: No headaches, memory loss, loss of strength, numbness, or tremors  SKIN: No itching, burning, rashes, or lesions   LYMPH NODES: No enlarged glands  ENDOCRINE: No heat or cold intolerance; No hair loss; No polydipsia or polyuria  MUSCULOSKELETAL: + joint pain or swelling; No muscle, back, or extremity pain  PSYCHIATRIC: No depression, anxiety, mood swings, or difficulty sleeping  HEME/LYMPH: No easy bruising, or bleeding gums  ALLERGY AND IMMUNOLOGIC: No hives or eczema    Vital Signs Last 24 Hrs  T(C): 36.4 (18 Oct 2021 16:44), Max: 36.8 (17 Oct 2021 20:23)  T(F): 97.6 (18 Oct 2021 16:44), Max: 98.3 (17 Oct 2021 20:23)  HR: 82 (18 Oct 2021 16:44) (60 - 82)  BP: 146/75 (18 Oct 2021 16:44) (145/78 - 155/84)  BP(mean): --  RR: 18 (18 Oct 2021 16:44) (17 - 18)  SpO2: 95% (18 Oct 2021 16:44) (92% - 95%)    PHYSICAL EXAM:  GENERAL: NAD, well-groomed, well-developed  HEAD:  Atraumatic, Normocephalic  EYES: EOMI, PERRLA, conjunctiva and sclera clear  ENMT: No tonsillar erythema, exudates, or enlargement; Moist mucous membranes, Good dentition, No lesions  NECK: Supple, No JVD, Normal thyroid  NERVOUS SYSTEM:  Alert & Oriented X3, Good concentration; Motor Strength 5/5 B/L upper and lower extremities; DTRs 2+ intact and symmetric  CHEST/LUNG: Clear to auscultation bilaterally; No rales, rhonchi, wheezing, or rubs  HEART: Regular rate and rhythm; No murmurs, rubs, or gallops  ABDOMEN: Soft, Nontender, Nondistended; Bowel sounds present  EXTREMITIES:  2+ Peripheral Pulses, No clubbing, cyanosis, or edema  LYMPH: No lymphadenopathy noted  SKIN: No rashes or lesions    LABS:              CAPILLARY BLOOD GLUCOSE          RADIOLOGY & ADDITIONAL TESTS:    Notes Reviewed:  [x ] YES  [ ] NO    Care Discussed with Consultants/Other Providers [x ] YES  [ ] NO
[facility] -- Ted Sloan, Ashish, Shalonda, Juan Pablo Boone Savella  Office # [phone]      Follow Up:    syncope  Subjective/Observations:   No events overnight resting comfortably in bed.  No complaints of chest pain, dyspnea, or palpitations reported. No signs of orthopnea or PND.     REVIEW OF SYSTEMS: All other review of systems is negative unless indicated above    PAST MEDICAL & SURGICAL HISTORY:  HTN (hypertension)    Glaucoma    No significant past surgical history        MEDICATIONS  (STANDING):  acetaminophen   Tablet .. 650 milliGRAM(s) Oral every 6 hours  ascorbic acid 500 milliGRAM(s) Oral daily  cholecalciferol 1000 Unit(s) Oral daily  enalapril 20 milliGRAM(s) Oral every 24 hours  gabapentin 100 milliGRAM(s) Oral three times a day  heparin   Injectable 5000 Unit(s) SubCutaneous every 8 hours  lidocaine   4% Patch 2 Patch Transdermal daily  methocarbamol 500 milliGRAM(s) Oral three times a day  polyethylene glycol 3350 17 Gram(s) Oral two times a day    MEDICATIONS  (PRN):  oxyCODONE    IR 5 milliGRAM(s) Oral every 4 hours PRN Severe Pain (7 - 10)  traMADol 25 milliGRAM(s) Oral every 4 hours PRN Moderate Pain (4 - 6)      Allergies    No Known Allergies    Intolerances        Vital Signs Last 24 Hrs  T(C): 37 (16 Oct 2021 11:39), Max: 37.1 (15 Oct 2021 16:46)  T(F): 98.6 (16 Oct 2021 11:39), Max: 98.7 (15 Oct 2021 16:46)  HR: 78 (16 Oct 2021 11:39) (77 - 84)  BP: 130/86 (16 Oct 2021 11:39) (105/53 - 140/78)  BP(mean): --  RR: 18 (16 Oct 2021 11:39) (18 - 18)  SpO2: 93% (16 Oct 2021 11:39) (93% - 96%)    I&O's Summary    15 Oct 2021 07:01  -  16 Oct 2021 07:00  --------------------------------------------------------  IN: 200 mL / OUT: 0 mL / NET: 200 mL          PHYSICAL EXAM:  TELE: Off tele   Constitutional: NAD, awake and alert, well-developed  HEENT: Moist Mucous Membranes, Anicteric  Pulmonary: Non-labored, breath sounds are clear bilaterally, No wheezing, crackles or rhonchi  Cardiovascular: Regular, S1 and S2 nl, No murmurs, rubs, gallops or clicks  Gastrointestinal: Bowel Sounds present, soft, nontender.   Lymph: No lymphadenopathy. No peripheral edema.  Skin: No visible rashes or ulcers.  Psych:  Mood & affect appropriate    LABS: All Labs Reviewed:                        13.6   10.39 )-----------( 228      ( 14 Oct 2021 06:52 )             40.3     14 Oct 2021 06:52    140    |  109    |  29     ----------------------------<  99     3.7     |  25     |  0.89     Ca    9.0        14 Oct 2021 06:52    < from: TTE Echo Complete w/o Contrast w/ Doppler (10.13.21 @ 08:47) >  EXAM:  ECHO TTE WO CON COMP W DOPP         PROCEDURE DATE:  10/13/2021        INTERPRETATION:  INDICATION: Syncope  Sonographer PH    Blood Pressure 150/72    Height unavailable     Weight 63 kg    Dimensions:  LA 2.9       Normal Values: 2.0 - 4.0 cm  Ao 2.6        Normal Values: 2.0 - 3.8 cm  SEPTUM 1.0       Normal Values: 0.6 - 1.2 cm  PWT 0.9       Normal Values: 0.6 - 1.1 cm  LVIDd 4.0         Normal Values: 3.0 - 5.6 cm  LVIDs 2.6         Normal Values: 1.8 - 4.0 cm      OBSERVATIONS:  Mitral Valve: Mitral annular calcification, trace physiologic MR.  Aortic Valve/Aorta: Aortic valve is not well-visualized but appears sclerotic with likely normal opening  Tricuspid Valve: normal with trace TR.  Pulmonic Valve: Not well-visualized  Left Atrium: normal  Right Atrium: Not well-visualized  Left Ventricle: normal LV size and systolic function, estimated LVEF of 65%.  Right Ventricle: Grossly normal size and systolic function.  Pericardium: no significant pericardial effusion.  Pulmonary/RV Pressure: estimated PA systolic pressure of 25mmHg        IMPRESSION:  Normal left ventricular internal dimensions and systolic function, estimated LVEF of 65%.  Grossly normal RV size and systolic function.  Aortic valve is not well-visualizedbut appears sclerotic with likely normal opening  Trace physiologic MR and TR.  No significant pericardial effusion.    --- End of Report ---              DEVEN MAN MD; Attending Cardiologist  This document has been electronically signed. Oct 14 2021 12:37PM    < end of copied text >                        
Patient is a 89y old  Female who presents with a chief complaint of back pain, weakness post syncope/fall    INTERVAL HPI/OVERNIGHT EVENTS:  T(C): 36.9 (10-13-21 @ 11:13), Max: 37.2 (10-13-21 @ 04:12)  HR: 94 (10-13-21 @ 13:35) (75 - 98)  BP: 117/70 (10-13-21 @ 13:35) (117/70 - 200/125)  RR: 16 (10-13-21 @ 11:13) (16 - 18)  SpO2: 98% (10-13-21 @ 13:35) (92% - 98%)  Wt(kg): --  I&O's Summary      LABS:                        13.8   11.14 )-----------( 219      ( 13 Oct 2021 07:51 )             41.6     10-13    138  |  104  |  34<H>  ----------------------------<  91  4.2   |  27  |  0.96    Ca    9.6      13 Oct 2021 07:51    TPro  7.4  /  Alb  3.4  /  TBili  1.1  /  DBili  x   /  AST  91<H>  /  ALT  73  /  AlkPhos  84  10-13    PT/INR - ( 12 Oct 2021 11:21 )   PT: 11.9 sec;   INR: 1.02 ratio         PTT - ( 12 Oct 2021 11:21 )  PTT:28.0 sec    CAPILLARY BLOOD GLUCOSE                MEDICATIONS  (STANDING):  acetaminophen   Tablet .. 650 milliGRAM(s) Oral every 6 hours  ascorbic acid 500 milliGRAM(s) Oral daily  aspirin enteric coated 81 milliGRAM(s) Oral daily  cholecalciferol 1000 Unit(s) Oral daily  enalapril 5 milliGRAM(s) Oral daily  heparin   Injectable 5000 Unit(s) SubCutaneous every 12 hours  lidocaine   4% Patch 2 Patch Transdermal daily  methocarbamol 500 milliGRAM(s) Oral three times a day  polyethylene glycol 3350 17 Gram(s) Oral two times a day  sodium chloride 0.9%. 1000 milliLiter(s) (50 mL/Hr) IV Continuous <Continuous>    MEDICATIONS  (PRN):  traMADol 25 milliGRAM(s) Oral every 4 hours PRN Moderate Pain (4 - 6)      REVIEW OF SYSTEMS:  CONSTITUTIONAL: No fever, weight loss, or fatigue  EYES: No eye pain, visual disturbances, or discharge  ENMT:  No difficulty hearing, tinnitus, vertigo; No sinus or throat pain  NECK: No pain or stiffness  RESPIRATORY: No cough, wheezing, chills or hemoptysis; No shortness of breath  CARDIOVASCULAR: No chest pain, palpitations, dizziness, or leg swelling  GASTROINTESTINAL: No abdominal or epigastric pain. No nausea, vomiting, or hematemesis; No diarrhea or constipation. No melena or hematochezia.  GENITOURINARY: No dysuria, frequency, hematuria, or incontinence  NEUROLOGICAL: No headaches, memory loss, loss of strength, numbness, or tremors  SKIN: No itching, burning, rashes, or lesions   LYMPH NODES: No enlarged glands  ENDOCRINE: No heat or cold intolerance; No hair loss  MUSCULOSKELETAL: back pain  PSYCHIATRIC: No depression, anxiety, mood swings, or difficulty sleeping  HEME/LYMPH: No easy bruising, or bleeding gums  ALLERY AND IMMUNOLOGIC: No hives or eczema    RADIOLOGY & ADDITIONAL TESTS:    Imaging Personally Reviewed:  [ ] YES  [ ] NO    Consultant(s) Notes Reviewed:  [ ] YES  [ ] NO    PHYSICAL EXAM:  GENERAL: NAD, well-groomed, well-developed  HEAD:  Atraumatic, Normocephalic  EYES: EOMI, PERRLA, conjunctiva and sclera clear  ENMT: No tonsillar erythema, exudates, or enlargement; Moist mucous membranes, Good dentition, No lesions  NECK: Supple, No JVD, Normal thyroid  NERVOUS SYSTEM:  Alert & Oriented X3, Good concentration; Motor Strength 5/5 B/L upper and lower extremities; DTRs 2+ intact and symmetric  CHEST/LUNG: Clear to percussion bilaterally; No rales, rhonchi, wheezing, or rubs  HEART: Regular rate and rhythm; No murmurs, rubs, or gallops  ABDOMEN: Soft, Nontender, Nondistended; Bowel sounds present  EXTREMITIES:  2+ Peripheral Pulses, No clubbing, cyanosis, or edema  LYMPH: No lymphadenopathy noted  SKIN: No rashes or lesions    Care Discussed with Consultants/Other Providers [ x] YES  [ ] NO    advance care planning and advance directives, including long term care plan discussion [x]YES   [ ]NO
Patient is a 89y old  Female who presents with a chief complaint of syncope (15 Oct 2021 13:05)      INTERVAL /OVERNIGHT EVENTS: feels better    MEDICATIONS  (STANDING):  acetaminophen   Tablet .. 650 milliGRAM(s) Oral every 6 hours  ascorbic acid 500 milliGRAM(s) Oral daily  cholecalciferol 1000 Unit(s) Oral daily  enalapril 20 milliGRAM(s) Oral every 24 hours  gabapentin 100 milliGRAM(s) Oral three times a day  heparin   Injectable 5000 Unit(s) SubCutaneous every 8 hours  lidocaine   4% Patch 2 Patch Transdermal daily  methocarbamol 500 milliGRAM(s) Oral three times a day  polyethylene glycol 3350 17 Gram(s) Oral two times a day    MEDICATIONS  (PRN):  oxyCODONE    IR 5 milliGRAM(s) Oral every 4 hours PRN Severe Pain (7 - 10)  traMADol 25 milliGRAM(s) Oral every 4 hours PRN Moderate Pain (4 - 6)      Allergies    No Known Allergies    Intolerances        REVIEW OF SYSTEMS:  CONSTITUTIONAL: No fever, weight loss, or fatigue  EYES: No eye pain, visual disturbances, or discharge  ENMT:  No difficulty hearing, tinnitus, vertigo; No sinus or throat pain  NECK: No pain or stiffness  RESPIRATORY: No cough, wheezing, chills or hemoptysis; No shortness of breath  CARDIOVASCULAR: No chest pain, palpitations, dizziness, or leg swelling  GASTROINTESTINAL: No abdominal or epigastric pain. No nausea, vomiting, or hematemesis; No diarrhea or constipation. No melena or hematochezia.  GENITOURINARY: No dysuria, frequency, hematuria, or incontinence  NEUROLOGICAL: No headaches, memory loss, loss of strength, numbness, or tremors  SKIN: No itching, burning, rashes, or lesions   LYMPH NODES: No enlarged glands  ENDOCRINE: No heat or cold intolerance; No hair loss; No polydipsia or polyuria  MUSCULOSKELETAL: No joint pain or swelling; No muscle, back, or extremity pain  PSYCHIATRIC: No depression, anxiety, mood swings, or difficulty sleeping  HEME/LYMPH: No easy bruising, or bleeding gums  ALLERGY AND IMMUNOLOGIC: No hives or eczema    Vital Signs Last 24 Hrs  T(C): 37 (16 Oct 2021 11:39), Max: 37.1 (15 Oct 2021 20:27)  T(F): 98.6 (16 Oct 2021 11:39), Max: 98.7 (15 Oct 2021 20:27)  HR: 78 (16 Oct 2021 11:39) (78 - 84)  BP: 130/86 (16 Oct 2021 11:39) (112/60 - 140/78)  BP(mean): --  RR: 18 (16 Oct 2021 11:39) (18 - 18)  SpO2: 93% (16 Oct 2021 11:39) (93% - 94%)    PHYSICAL EXAM:  GENERAL: NAD, well-groomed, well-developed  HEAD:  Atraumatic, Normocephalic  EYES: EOMI, PERRLA, conjunctiva and sclera clear  ENMT: No tonsillar erythema, exudates, or enlargement; Moist mucous membranes, Good dentition, No lesions  NECK: Supple, No JVD, Normal thyroid  NERVOUS SYSTEM:  Alert & Oriented X3, Good concentration; Motor Strength 5/5 B/L upper and lower extremities; DTRs 2+ intact and symmetric  CHEST/LUNG: Clear to auscultation bilaterally; No rales, rhonchi, wheezing, or rubs  HEART: Regular rate and rhythm; No murmurs, rubs, or gallops  ABDOMEN: Soft, Nontender, Nondistended; Bowel sounds present  EXTREMITIES:  2+ Peripheral Pulses, No clubbing, cyanosis, or edema  LYMPH: No lymphadenopathy noted  SKIN: No rashes or lesions    LABS:              CAPILLARY BLOOD GLUCOSE          RADIOLOGY & ADDITIONAL TESTS:    Notes Reviewed:  [x ] YES  [ ] NO    Care Discussed with Consultants/Other Providers [x ] YES  [ ] NO
Patient is a 89y old  Female who presents with a chief complaint of syncope (15 Oct 2021 13:05)      INTERVAL /OVERNIGHT EVENTS: feels better    MEDICATIONS  (STANDING):  acetaminophen   Tablet .. 650 milliGRAM(s) Oral every 6 hours  ascorbic acid 500 milliGRAM(s) Oral daily  cholecalciferol 1000 Unit(s) Oral daily  enalapril 20 milliGRAM(s) Oral every 24 hours  gabapentin 100 milliGRAM(s) Oral three times a day  heparin   Injectable 5000 Unit(s) SubCutaneous every 8 hours  lidocaine   4% Patch 2 Patch Transdermal daily  methocarbamol 500 milliGRAM(s) Oral three times a day  polyethylene glycol 3350 17 Gram(s) Oral two times a day    MEDICATIONS  (PRN):  oxyCODONE    IR 5 milliGRAM(s) Oral every 4 hours PRN Severe Pain (7 - 10)  traMADol 25 milliGRAM(s) Oral every 4 hours PRN Moderate Pain (4 - 6)    Allergies    No Known Allergies    Intolerances    REVIEW OF SYSTEMS:  CONSTITUTIONAL: No fever, weight loss, or fatigue  All 12 systems reviewed    Vital Signs Last 24 Hrs  T(C): 36.8 (17 Oct 2021 20:23), Max: 36.8 (17 Oct 2021 20:23)  T(F): 98.3 (17 Oct 2021 20:23), Max: 98.3 (17 Oct 2021 20:23)  HR: 68 (17 Oct 2021 20:23) (68 - 83)  BP: 145/78 (17 Oct 2021 20:23) (101/64 - 154/82)  RR: 18 (17 Oct 2021 20:23) (18 - 18)  SpO2: 92% (17 Oct 2021 20:23) (92% - 95%)    PHYSICAL EXAM:  GENERAL: NAD, well-groomed, well-developed  HEAD:  Atraumatic, Normocephalic  EYES: EOMI, PERRLA, conjunctiva and sclera clear  ENMT: No tonsillar erythema, exudates, or enlargement; Moist mucous membranes, Good dentition, No lesions  NECK: Supple, No JVD, Normal thyroid  NERVOUS SYSTEM:  Alert & Oriented X3, Good concentration; Motor Strength 5/5 B/L upper and lower extremities; DTRs 2+ intact and symmetric  CHEST/LUNG: Clear to auscultation bilaterally; No rales, rhonchi, wheezing, or rubs  HEART: Regular rate and rhythm; No murmurs, rubs, or gallops  ABDOMEN: Soft, Nontender, Nondistended; Bowel sounds present  EXTREMITIES:  2+ Peripheral Pulses, No clubbing, cyanosis, or edema  LYMPH: No lymphadenopathy noted  SKIN: No rashes or lesions    LABS:    RADIOLOGY & ADDITIONAL TESTS:    Notes Reviewed:  [x ] YES  [ ] NO    Care Discussed with Consultants/Other Providers [x ] YES  [ ] NO

## 2021-10-19 NOTE — DISCHARGE NOTE NURSING/CASE MANAGEMENT/SOCIAL WORK - PATIENT PORTAL LINK FT
You can access the FollowMyHealth Patient Portal offered by [facility] by registering at the following website: [email or web address]. By joining [email or web address]’s FollowMyHealth portal, you will also be able to view your health information using other applications (apps) compatible with our system.

## 2021-10-19 NOTE — CHART NOTE - NSCHARTNOTEFT_GEN_A_CORE
Do you have Advance Directives (HCP / LV / Organ donation / Documentation of oral advance Directive):   (  x  )  yes    (      )    NO                                                                            Do you have LV - Living will :                                                                                                                                             ( x   )  yes    (      )   No    Do you have HCP - Health Care Proxy:                                                                                                                            (  x   )  yes   (       ) N0    Do you have DNR- Do Not Resuscitate :                                                                                                                           (   x   )  yes  (        )  No    Do you have DNI- Do Not intubate  :                                                                                                                               (    x  )  yes   (       ) No    Do you have MOLST - Medical orders for Life sustaining treatment  :                                                                    (   x   ) yes    (       ) No    Decision Maker :  (  x   ) Patient     (      )  HCA   (     ) Public Health Law Surrogate     (      ) Surrogate  (       ) Guardian    Goals of Care :  (      )   Complete Care     (       ) No Limitations                              (       )   Comfort Care       (       )  Hospice                               (  x    )   Limited medical Intervention / s    Medical Interventions :   (        )   CPR       (   x     )  DNR                                               (        )  Intubation with MV - Mechanical Ventilation  (      ) BIPAP/CPAP    (    x     )   DNI                                               (         )  Artificial Nutrition -  IVF, TPN / PPN, Tube Feeds             (     x    )   No Feeding Tube                                                (      x  ) Use Antibiotics                         (          ) No Antibiotics                                                (     x    ) Blood and Blood Products     (         )   No Blood or Blood products                                                (          )  Dialysis                                    (    x     )  No Dialysis                                                (          )  Medical Management only  (         )  No Invasive Interventions or Surgery  Time spent :                        (    x   ) upto 30 minutes                       (           )   more than 30 minutes  ACP reviewed and discussed

## 2021-10-19 NOTE — PROGRESS NOTE ADULT - ASSESSMENT
89 yo female with PMH HTN and glaucoma presents to ED after syncopal episode. Cariology consulted for syncope.     Syncope  - Trops negative, unlikely ischemic event   - Stress and carotid US <5 years ago wnl.   - Syncope overall seems most likely to be on the basis of dehydration and perhaps a vasovagal phenomenon, she reprorts 3-4 bm that am  prior to syncope   - was Orthostatics positive s/p IVF , would  recheck  - Compression stockings  - EKG showed NSR with 1st degree AVB, unremarkable for etiology for her syncope  -TTE nL LV & RV size and function no significant valvular dysfunction EF 65%  - Head CT no ICH    HTN  -135/72  - Continue Enalapril as tolerated     back pain as per primary   dc planning     Flora Ashton FNP-C  Cardiology NP  [id number] [phone]

## 2021-10-19 NOTE — PROGRESS NOTE ADULT - ATTENDING COMMENTS
was orthostatic, now s/p ivf  would consider recheck of orthost v/s  planning dc to [facility]  back pain ?2/2 constipation

## 2021-10-19 NOTE — DIETITIAN INITIAL EVALUATION ADULT. - OTHER INFO
89 y/o female adm with syncope and collapse. PMH glaucoma, HTN. Pt visited at bedside. Pt tolerating meals with a very good appetite. Last BM 10/18.

## 2021-10-19 NOTE — CONSULT NOTE ADULT - ASSESSMENT
89F with syncopal episode, likely vasovagal during bowel movement    No subsequent fractures noted, workup largely negative. Have discussed valsalva maneuver with patient if she ever feels light headed or dizzy on transfers or during ambulation especially, to bear down to raise intracranial pressure with contraction of abdominal musculature as if she is straining for a bowel movement. Patient acknowledged understanding.    For patients thoracic compression fracture, consider standing tylenol 1000mg TID, would avoid tramadol in general, rather replace with 2.5mg oxycodone BID PRN for the time being. For acute on chronic pain, calcitonin in alternating nares for about 1-2 week course is shown in literature review to bring short term benefit. Patient also a good candidate for trigger point injections paraspinally which literature also shows good evidence for relief of pain in setting of compression fx. Moist heat, ultrasound/diathermy good adjunct pain relief once she is in inpatient rehab setting.    Discussed with patient signs/symptoms of cauda equina syndrome including saddle region anesthesia, loss of bowel or bladder control, acutely progressing weakness in lower extremities. Patient acknowledged understanding as well.    Functionally, patients therapy notes are reviewed, and relative to her pre morbid functional status, patient has functional deficits noted in PT and OT, with respect to ambulation and quality of gait as well as distance ambulated, impaired balance and strength, impaired transfers and bed mobility, and decreased exercise tolerance/endurance. She is a good candidate for subacute rehabilitation.     Overall 75 minutes spent over half of which spent discussing abovementioned topics as well as risks/benefits/alternatives of pain medications and modality related methods of analgesia.

## 2021-10-19 NOTE — PROGRESS NOTE ADULT - PROVIDER SPECIALTY LIST ADULT
Family Medicine
Family Medicine
Neurology
Neurology
Cardiology
Cardiology
Family Medicine
Family Medicine
Hospitalist
Neurology
Cardiology
Hospitalist
Cardiology

## 2021-11-02 RX ORDER — OXYCODONE HYDROCHLORIDE 5 MG/1
1 TABLET ORAL
Qty: 30 | Refills: 0
Start: 2021-11-02 | End: 2021-11-06

## 2022-06-08 NOTE — PROGRESS NOTE ADULT - ASSESSMENT
89 yr old female admitted with syncopal episode  suspect vasovagal with element of dehydration.  states that musculoskeletal pain is limiting her ability to ambulate    pain control- will start atc tylenol, lidoerm patch, and prn tramadol    PT follow up- may need DANIEL    syncope - as above, suspect vasovagal.  follow up echo [facility])  General Surgery  Consult Note    Patient Name: Odette Hart  MRN: [id number]  Code Status: Full Code  Admission Date: 6/8/2022  Hospital Length of Stay: 0 days  Attending Physician: Sanjuanita Briones MD   Primary Care Provider: Braxton Barragan MD    Patient information was obtained from patient and ER records.     Inpatient consult to General Surgery  Consult performed by: Stu Jacobsen MD  Consult ordered by: Sanjuanita Briones MD  Reason for consult: Superficial wound dehiscence  Assessment/Recommendations: Recommend local wound care  No need for surgical debridement.        Subjective:     Principal Problem: Acute on chronic congestive heart failure    History of Present Illness: 64 yo recently discharged to home with home health after chf exacerbation, comes with shortness of breath and nausea, vomiting, abdominal pain, some drainage from  midline abdominal incision.Patient states she just got home health to see her day before yesterday.She has been having trouble with leaking of the ostomy bag at home.Does c/o nausea and vomiting yesterday and abdominal pain with eating.Patient not consistently taking her lasix but admits to taking aldactone.Continue lasix bid for chf exacerbation.      No current facility-administered medications on file prior to encounter.     Current Outpatient Medications on File Prior to Encounter   Medication Sig    acetaminophen (TYLENOL) 500 MG tablet Take 2 tablets (1,000 mg total) by mouth every 8 (eight) hours as needed for Pain.    albuterol (PROVENTIL/VENTOLIN HFA) 90 mcg/actuation inhaler Inhale 2 puffs into the lungs every 6 (six) hours as needed for Wheezing. Rescue    ALPRAZolam (XANAX) 0.5 MG tablet Take 1 tablet (0.5 mg total) by mouth nightly as needed for Anxiety.    aspirin (ECOTRIN) 81 MG EC tablet Take 1 tablet (81 mg total) by mouth once daily.    carvediloL (COREG) 3.125 MG tablet Take 1 tablet (3.125 mg total) by mouth 2  (two) times daily with meals.    clopidogreL (PLAVIX) 75 mg tablet Take 1 tablet (75 mg total) by mouth once daily.    ezetimibe (ZETIA) 10 mg tablet Take 1 tablet (10 mg total) by mouth every evening.    fluticasone propionate (FLONASE) 50 mcg/actuation nasal spray 1 spray (50 mcg total) by Each Nostril route once daily.    furosemide (LASIX) 20 MG tablet Take 1 tablet (20 mg total) by mouth once daily. (Patient not taking: Reported on 6/1/2022)    HYDROcodone-acetaminophen (NORCO) 5-325 mg per tablet Take 1 tablet by mouth every 12 (twelve) hours as needed for Pain.    Lactobacillus rhamnosus GG (CULTURELLE) 10 billion cell capsule Take 1 capsule by mouth once daily.    nitrofurantoin, macrocrystal-monohydrate, (MACROBID) 100 MG capsule Take 1 capsule (100 mg total) by mouth 2 (two) times daily. for 5 days    nitroGLYCERIN (NITROSTAT) 0.4 MG SL tablet Place 1 tablet (0.4 mg total) under the tongue every 5 (five) minutes as needed for Chest pain.    pantoprazole (PROTONIX) 40 MG tablet Take 1 tablet (40 mg total) by mouth once daily.    polyethylene glycol (GLYCOLAX) 17 gram PwPk Take 17 g by mouth once daily.    simethicone (MYLICON) 80 MG chewable tablet Take 80 mg by mouth every 8 (eight) hours as needed.    spironolactone (ALDACTONE) 25 MG tablet Take 0.5 tablets (12.5 mg total) by mouth once daily.    [DISCONTINUED] metoprolol succinate (TOPROL-XL) 25 MG 24 hr tablet Take 1 tablet (25 mg total) by mouth once daily.    [DISCONTINUED] prasugreL (EFFIENT) 10 mg Tab Take 1 tablet (10 mg total) by mouth once daily.       Review of patient's allergies indicates:   Allergen Reactions    Augmentin [amoxicillin-pot clavulanate] Swelling     Not allergic to amoxicillin just a derivative of augmentin    Lisinopril Other (See Comments)     Fluid around heart    Losartan Other (See Comments)     High potassium    Shellfish containing products Anaphylaxis     Pt.states she is allergic to SEAFOOD since the  age of 12    Levaquin [levofloxacin] Other (See Comments)     Very bad joint pain    Clindamycin Palpitations     Chest pain       Past Medical History:   Diagnosis Date    Acute coronary syndrome     Acute exacerbation of chronic obstructive pulmonary disease (COPD) 3/23/2022    Allergy     Arthritis     Cardiomyopathy     CHF (congestive heart failure)     Coronary artery disease     Coronary artery disease of native artery of native heart with stable angina pectoris 2021: [facility]: Cath: LAD: Proximal stent patent. LCX: Proximal 80%. LCX: Dominant. Moderate disease. LCX: HEVER 2.75 x 18 mm. Distal dissection. HEVER 2.75 x 22 mm.     Diverticulitis     Diverticulosis     Familial hypercholesterolemia 2022    Former smoker 2022    Heart attack     Heart disease     History of myocardial infarction 2022    Hyperlipidemia     Hypertension     Hypertension 2022    ICD (implantable cardioverter-defibrillator) in place     Non-ST elevation myocardial infarction (NSTEMI) 2019     Past Surgical History:   Procedure Laterality Date    APPENDECTOMY N/A 2022    Procedure: APPENDECTOMY;  Surgeon: Rafa Cardozo MD;  [facility];  Service: Colon and Rectal;  Laterality: N/A;    ATRIAL CARDIAC PACEMAKER INSERTION      CECECTOMY N/A 2022    Procedure: EXCISION, CECUM;  Surgeon: Rafa Cardozo MD;  [facility];  Service: Colon and Rectal;  Laterality: N/A;     SECTION      CHOLECYSTECTOMY N/A 2022    Procedure: CHOLECYSTECTOMY;  Surgeon: Doug Epstein MD;  [facility];  Service: General;  Laterality: N/A;    COLONOSCOPY N/A 2022    Procedure: COLONOSCOPY;  Surgeon: Rafa Cardozo MD;  [facility];  Service: Colon and Rectal;  Laterality: N/A;    COLOSTOMY  2022    Procedure: CREATION, COLOSTOMY;  Surgeon: Rafa Cardozo MD;  [facility];  Service:  Colon and Rectal;;    CORONARY STENT PLACEMENT      LEFT HEART CATHETERIZATION Left 2022    Procedure: CATHETERIZATION, HEART, LEFT;  Surgeon: Yohannes Cordova MD;  Location[facility] C[facility] LAB;  Service: Cardiology;  Laterality: Left;     Family History       Problem Relation (Age of Onset)    Emphysema Father    Heart attack Brother    Heart disease Mother          Tobacco Use    Smoking status: Former Smoker     Packs/day: 0.50     Start date: 1976     Quit date: 2012     Years since quittin.5    Smokeless tobacco: Never Used   Substance and Sexual Activity    Alcohol use: No    Drug use: No    Sexual activity: Not on file     Review of Systems   Constitutional:  Negative for appetite change, fatigue, fever and unexpected weight change.   HENT:  Negative for sore throat and trouble swallowing.    Eyes: Negative.    Respiratory:  Negative for cough, shortness of breath and wheezing.    Cardiovascular:  Negative for chest pain and leg swelling.   Gastrointestinal:  Negative for abdominal distention, abdominal pain, blood in stool, constipation, diarrhea, nausea and vomiting.   Endocrine: Negative.    Genitourinary: Negative.    Musculoskeletal:  Negative for back pain.   Skin: Negative.  Negative for rash.   Allergic/Immunologic: Negative.    Neurological: Negative.    Hematological: Negative.    Psychiatric/Behavioral:  Negative for confusion.    Objective:     Vital Signs (Most Recent):  Temp: 97.6 °F (36.4 °C) (22 1124)  Pulse: 70 (22 1200)  Resp: 14 (22 1124)  BP: (!) 92/53 (22 1124)  SpO2: 96 % (22 1124)   Vital Signs (24h Range):  Temp:  [97.6 °F (36.4 °C)-98.8 °F (37.1 °C)] 97.6 °F (36.4 °C)  Pulse:  [70-93] 70  Resp:  [14-36] 14  SpO2:  [89 %-97 %] 96 %  BP: ()/(52-84) 92/53     Weight: 50.8 kg (111 lb 14.1 oz)  Body mass index is 19.82 kg/m².    Physical Exam  Vitals and nursing note reviewed.   Constitutional:       Appearance: She is well-developed.    HENT:      Head: Normocephalic and atraumatic.   Cardiovascular:      Rate and Rhythm: Normal rate.      Heart sounds: Normal heart sounds.   Pulmonary:      Effort: Pulmonary effort is normal.   Abdominal:      General: Bowel sounds are normal. There is no distension.      Palpations: Abdomen is soft.      Tenderness: There is no abdominal tenderness.   Musculoskeletal:         General: Normal range of motion.      Cervical back: Normal range of motion.   Skin:     General: Skin is warm and dry.      Capillary Refill: Capillary refill takes less than 2 seconds.             Comments: 1.5 cm drainage from the most superior aspect of the incision.  Inferior aspect of incision has a small opening as well with minimal amount of drainage and some fibrinous exudate.     Neurological:      Mental Status: She is alert and oriented to person, place, and time.   Psychiatric:         Behavior: Behavior normal.       Significant Labs:  I have reviewed all pertinent lab results within the past 24 hours.  CBC:   Recent Labs   Lab 06/08/22 0107   WBC 4.50   RBC 3.70*   HGB 10.9*   HCT 33.1*      MCV 90   MCH 29.5   MCHC 32.9     CMP:   Recent Labs   Lab 06/08/22 0107   *   CALCIUM 8.9   ALBUMIN 2.1*   PROT 6.7   *   K 3.8   CO2 24   CL 96   BUN 18   CREATININE 0.8   ALKPHOS 138*   ALT 15   AST 27   BILITOT 0.8           Assessment/Plan:     Superficial dehiscence of operation wound  Patient with a superficial wound dehiscence and her lower midline incision.  Recommend local wound care for now.  No sign of a deep abscess or obvious infection.  Plan discussed with hospitalist.        VTE Risk Mitigation (From admission, onward)         Ordered     enoxaparin injection 40 mg  Daily         06/08/22 1122     IP VTE HIGH RISK PATIENT  Once         06/08/22 0253     Place sequential compression device  Until discontinued         06/08/22 0253     Place sequential compression device  Until discontinued          06/08/22 0225                Thank you for your consult. I will follow-up with patient. Please contact us if you have any additional questions.    Stu Jacobsen MD  General Surgery  [facility])

## 2022-11-28 ENCOUNTER — EMERGENCY (EMERGENCY)
Facility: HOSPITAL | Age: 87
LOS: 1 days | Discharge: HOME | End: 2022-11-28
Attending: EMERGENCY MEDICINE | Admitting: EMERGENCY MEDICINE
Payer: MEDICARE

## 2022-11-28 VITALS
DIASTOLIC BLOOD PRESSURE: 85 MMHG | WEIGHT: 130.07 LBS | OXYGEN SATURATION: 96 % | SYSTOLIC BLOOD PRESSURE: 170 MMHG | HEIGHT: 62 IN | TEMPERATURE: 98 F | HEART RATE: 57 BPM | RESPIRATION RATE: 18 BRPM

## 2022-11-28 PROBLEM — I10 ESSENTIAL (PRIMARY) HYPERTENSION: Chronic | Status: ACTIVE | Noted: 2021-10-12

## 2022-11-28 PROBLEM — H40.9 UNSPECIFIED GLAUCOMA: Chronic | Status: ACTIVE | Noted: 2021-10-12

## 2022-11-28 LAB
ALBUMIN SERPL ELPH-MCNC: 3.5 G/DL — SIGNIFICANT CHANGE UP (ref 3.3–5)
ALP SERPL-CCNC: 91 U/L — SIGNIFICANT CHANGE UP (ref 40–120)
ALT FLD-CCNC: 24 U/L — SIGNIFICANT CHANGE UP (ref 12–78)
ANION GAP SERPL CALC-SCNC: 7 MMOL/L — SIGNIFICANT CHANGE UP (ref 5–17)
APPEARANCE UR: CLEAR — SIGNIFICANT CHANGE UP
AST SERPL-CCNC: 28 U/L — SIGNIFICANT CHANGE UP (ref 15–37)
BASOPHILS # BLD AUTO: 0.04 K/UL — SIGNIFICANT CHANGE UP (ref 0–0.2)
BASOPHILS NFR BLD AUTO: 0.3 % — SIGNIFICANT CHANGE UP (ref 0–2)
BILIRUB SERPL-MCNC: 0.3 MG/DL — SIGNIFICANT CHANGE UP (ref 0.2–1.2)
BILIRUB UR-MCNC: NEGATIVE — SIGNIFICANT CHANGE UP
BUN SERPL-MCNC: 30 MG/DL — HIGH (ref 7–23)
CALCIUM SERPL-MCNC: 9.5 MG/DL — SIGNIFICANT CHANGE UP (ref 8.5–10.1)
CHLORIDE SERPL-SCNC: 107 MMOL/L — SIGNIFICANT CHANGE UP (ref 96–108)
CO2 SERPL-SCNC: 27 MMOL/L — SIGNIFICANT CHANGE UP (ref 22–31)
COLOR SPEC: YELLOW — SIGNIFICANT CHANGE UP
CREAT SERPL-MCNC: 1.1 MG/DL — SIGNIFICANT CHANGE UP (ref 0.5–1.3)
DIFF PNL FLD: NEGATIVE — SIGNIFICANT CHANGE UP
EGFR: 46 ML/MIN/1.73M2 — LOW
EOSINOPHIL # BLD AUTO: 0.09 K/UL — SIGNIFICANT CHANGE UP (ref 0–0.5)
EOSINOPHIL NFR BLD AUTO: 0.7 % — SIGNIFICANT CHANGE UP (ref 0–6)
GLUCOSE SERPL-MCNC: 120 MG/DL — HIGH (ref 70–99)
GLUCOSE UR QL: NEGATIVE — SIGNIFICANT CHANGE UP
HCT VFR BLD CALC: 45.1 % — HIGH (ref 34.5–45)
HGB BLD-MCNC: 14.6 G/DL — SIGNIFICANT CHANGE UP (ref 11.5–15.5)
IMM GRANULOCYTES NFR BLD AUTO: 0.6 % — SIGNIFICANT CHANGE UP (ref 0–0.9)
KETONES UR-MCNC: NEGATIVE — SIGNIFICANT CHANGE UP
LEUKOCYTE ESTERASE UR-ACNC: NEGATIVE — SIGNIFICANT CHANGE UP
LIDOCAIN IGE QN: 104 U/L — SIGNIFICANT CHANGE UP (ref 73–393)
LYMPHOCYTES # BLD AUTO: 1.71 K/UL — SIGNIFICANT CHANGE UP (ref 1–3.3)
LYMPHOCYTES # BLD AUTO: 13.5 % — SIGNIFICANT CHANGE UP (ref 13–44)
MCHC RBC-ENTMCNC: 30.5 PG — SIGNIFICANT CHANGE UP (ref 27–34)
MCHC RBC-ENTMCNC: 32.4 GM/DL — SIGNIFICANT CHANGE UP (ref 32–36)
MCV RBC AUTO: 94.4 FL — SIGNIFICANT CHANGE UP (ref 80–100)
MONOCYTES # BLD AUTO: 0.76 K/UL — SIGNIFICANT CHANGE UP (ref 0–0.9)
MONOCYTES NFR BLD AUTO: 6 % — SIGNIFICANT CHANGE UP (ref 2–14)
NEUTROPHILS # BLD AUTO: 10.01 K/UL — HIGH (ref 1.8–7.4)
NEUTROPHILS NFR BLD AUTO: 78.9 % — HIGH (ref 43–77)
NITRITE UR-MCNC: NEGATIVE — SIGNIFICANT CHANGE UP
NRBC # BLD: 0 /100 WBCS — SIGNIFICANT CHANGE UP (ref 0–0)
PH UR: 5 — SIGNIFICANT CHANGE UP (ref 5–8)
PLATELET # BLD AUTO: 260 K/UL — SIGNIFICANT CHANGE UP (ref 150–400)
POTASSIUM SERPL-MCNC: 4.5 MMOL/L — SIGNIFICANT CHANGE UP (ref 3.5–5.3)
POTASSIUM SERPL-SCNC: 4.5 MMOL/L — SIGNIFICANT CHANGE UP (ref 3.5–5.3)
PROT SERPL-MCNC: 7.7 G/DL — SIGNIFICANT CHANGE UP (ref 6–8.3)
PROT UR-MCNC: 15
RBC # BLD: 4.78 M/UL — SIGNIFICANT CHANGE UP (ref 3.8–5.2)
RBC # FLD: 13.7 % — SIGNIFICANT CHANGE UP (ref 10.3–14.5)
SODIUM SERPL-SCNC: 141 MMOL/L — SIGNIFICANT CHANGE UP (ref 135–145)
SP GR SPEC: 1.02 — SIGNIFICANT CHANGE UP (ref 1.01–1.02)
UROBILINOGEN FLD QL: NEGATIVE — SIGNIFICANT CHANGE UP
WBC # BLD: 12.68 K/UL — HIGH (ref 3.8–10.5)
WBC # FLD AUTO: 12.68 K/UL — HIGH (ref 3.8–10.5)

## 2022-11-28 PROCEDURE — 74177 CT ABD & PELVIS W/CONTRAST: CPT | Mod: 26,MA

## 2022-11-28 PROCEDURE — 99284 EMERGENCY DEPT VISIT MOD MDM: CPT | Mod: FS

## 2022-11-28 RX ORDER — MAGNESIUM HYDROXIDE 400 MG/1
30 TABLET, CHEWABLE ORAL ONCE
Refills: 0 | Status: COMPLETED | OUTPATIENT
Start: 2022-11-28 | End: 2022-11-28

## 2022-11-28 RX ORDER — TRAMADOL HYDROCHLORIDE 50 MG/1
50 TABLET ORAL ONCE
Refills: 0 | Status: DISCONTINUED | OUTPATIENT
Start: 2022-11-28 | End: 2022-11-28

## 2022-11-28 RX ORDER — SODIUM CHLORIDE 9 MG/ML
500 INJECTION INTRAMUSCULAR; INTRAVENOUS; SUBCUTANEOUS ONCE
Refills: 0 | Status: COMPLETED | OUTPATIENT
Start: 2022-11-28 | End: 2022-11-28

## 2022-11-28 RX ADMIN — SODIUM CHLORIDE 500 MILLILITER(S): 9 INJECTION INTRAMUSCULAR; INTRAVENOUS; SUBCUTANEOUS at 19:56

## 2022-11-28 RX ADMIN — SODIUM CHLORIDE 500 MILLILITER(S): 9 INJECTION INTRAMUSCULAR; INTRAVENOUS; SUBCUTANEOUS at 22:32

## 2022-11-28 NOTE — ED ADULT NURSE NOTE - OBJECTIVE STATEMENT
Received pt c/o rectal pain since today.   ED PA at bedside to perform rectal exam and notes hard stool to rectum.  No bleeding noted.   Abd soft, pt denies n/v/d.    Pt states "I felt fine yesterday the pain didn't start until today."   Respirations even and unlabored, family at bedside. BN

## 2022-11-28 NOTE — ED PROVIDER NOTE - OBJECTIVE STATEMENT
89 yo F PMHx HTN, glaucoma, mild dementia presents to ED for evaluation of chronic abdominal pain x 1 year. Rectal pain x today. pt with hx constipation on Miralax, on tramadol 50 mg tid for chronic pain. Denies N/V , fever/chills, dysuria, hematuria, back pain, dizziness, chest pain, SOB.

## 2022-11-28 NOTE — ED ADULT NURSE NOTE - NSIMPLEMENTINTERV_GEN_ALL_ED
Implemented All Fall with Harm Risk Interventions:  [place] to call system. Call bell, personal items and telephone within reach. Instruct patient to call for assistance. Room bathroom lighting operational. Non-slip footwear when patient is off stretcher. Physically safe environment: no spills, clutter or unnecessary equipment. Stretcher in lowest position, wheels locked, appropriate side rails in place. Provide visual cue, wrist band, yellow gown, etc. Monitor gait and stability. Monitor for mental status changes and reorient to person, place, and time. Review medications for side effects contributing to fall risk. Reinforce activity limits and safety measures with patient and family. Provide visual clues: red socks.

## 2022-11-28 NOTE — ED ADULT TRIAGE NOTE - CHIEF COMPLAINT QUOTE
Patient with hx of dementia, with clear speech BIBA from home for rectal pain that began this AM. Denies bleeding.

## 2022-11-28 NOTE — ED PROVIDER NOTE - GASTROINTESTINAL, MLM
Normoactive BS x 4; Abdomen soft, non-tender, no guarding. Normoactive BS x 4; Abdomen soft, non-tender, no guarding. No rectal fecal impaction on jorgito

## 2022-11-28 NOTE — ED PROVIDER NOTE - NS ED ATTENDING STATEMENT MOD
This was a shared visit with the ROLF. I reviewed and verified the documentation and independently performed the documented:

## 2022-11-28 NOTE — ED PROVIDER NOTE - CLINICAL SUMMARY MEDICAL DECISION MAKING FREE TEXT BOX
Patient with chronic abdominal pain x1 year complaining of rectal pain today.  No fever or chills nausea vomiting chest pain short of breath diarrhea dysuria hematuria.    Plan labs CT IV fluids Differential including but not limited to appendicitis diverticulitis colitis or other intra-abdominal infection

## 2022-11-28 NOTE — ED PROVIDER NOTE - PATIENT PORTAL LINK FT
You can access the FollowMyHealth Patient Portal offered by [facility] by registering at the following website: [email or web address]. By joining [facility] FollowMyHealth portal, you will also be able to view your health information using other applications (apps) compatible with our system.

## 2022-11-28 NOTE — ED PROVIDER NOTE - PROGRESS NOTE DETAILS
signed out to Dr. Bhatia- [facility]; pt pending reeval and dispo pt with large bm, improved. ready for d/c

## 2022-11-28 NOTE — ED PROVIDER NOTE - NSFOLLOWUPINSTRUCTIONS_ED_ALL_ED_FT
1) Follow up with her primary doctor in 1-2 days. Discuss bowel regimen.  2) Return to the ED immediately for new or worsening symptoms as we discussed.    Constipation, Adult      Constipation is when a person has fewer than three bowel movements in a week, has difficulty having a bowel movement, or has stools (feces) that are dry, hard, or larger than normal. Constipation may be caused by an underlying condition. It may become worse with age if a person takes certain medicines and does not take in enough fluids.      Follow these instructions at home:      Eating and drinking      •Eat foods that have a lot of fiber, such as beans, whole grains, and fresh fruits and vegetables.      •Limit foods that are low in fiber and high in fat and processed sugars, such as fried or sweet foods. These include french fries, hamburgers, cookies, candies, and soda.      •Drink enough fluid to keep your urine pale yellow.      General instructions     •Exercise regularly or as told by your health care provider. Try to do 150 minutes of moderate exercise each week.      •Use the bathroom when you have the urge to go. Do not hold it in.      •Take over-the-counter and prescription medicines only as told by your health care provider. This includes any fiber supplements.    •During bowel movements:   •Practice deep breathing while relaxing the lower abdomen.      •Practice pelvic floor relaxation.        •Watch your condition for any changes. Let your health care provider know about them.      •Keep all follow-up visits as told by your health care provider. This is important.        Contact a health care provider if:    •You have pain that gets worse.      •You have a fever.      •You do not have a bowel movement after 4 days.      •You vomit.      •You are not hungry or you lose weight.      •You are bleeding from the opening between the buttocks (anus).      •You have thin, pencil-like stools.        Get help right away if:    •You have a fever and your symptoms suddenly get worse.      •You leak stool or have blood in your stool.      •Your abdomen is bloated.      •You have severe pain in your abdomen.      •You feel dizzy or you faint.        Summary    •Constipation is when a person has fewer than three bowel movements in a week, has difficulty having a bowel movement, or has stools (feces) that are dry, hard, or larger than normal.      •Eat foods that have a lot of fiber, such as beans, whole grains, and fresh fruits and vegetables.      •Drink enough fluid to keep your urine pale yellow.      •Take over-the-counter and prescription medicines only as told by your health care provider. This includes any fiber supplements.      This information is not intended to replace advice given to you by your health care provider. Make sure you discuss any questions you have with your health care provider.      Document Revised: 11/04/2020 Document Reviewed: 11/04/2020    [place] Patient Education © 2022 [employer]

## 2022-11-29 PROCEDURE — 74177 CT ABD & PELVIS W/CONTRAST: CPT | Mod: MA

## 2022-11-29 PROCEDURE — 81001 URINALYSIS AUTO W/SCOPE: CPT

## 2022-11-29 PROCEDURE — 80053 COMPREHEN METABOLIC PANEL: CPT

## 2022-11-29 PROCEDURE — 85025 COMPLETE CBC W/AUTO DIFF WBC: CPT

## 2022-11-29 PROCEDURE — 99284 EMERGENCY DEPT VISIT MOD MDM: CPT | Mod: 25

## 2022-11-29 PROCEDURE — 96361 HYDRATE IV INFUSION ADD-ON: CPT

## 2022-11-29 PROCEDURE — 83690 ASSAY OF LIPASE: CPT

## 2022-11-29 PROCEDURE — 36415 COLL VENOUS BLD VENIPUNCTURE: CPT

## 2022-11-29 PROCEDURE — 96360 HYDRATION IV INFUSION INIT: CPT | Mod: XU

## 2022-11-29 RX ADMIN — TRAMADOL HYDROCHLORIDE 50 MILLIGRAM(S): 50 TABLET ORAL at 00:20

## 2022-11-29 RX ADMIN — MAGNESIUM HYDROXIDE 30 MILLILITER(S): 400 TABLET, CHEWABLE ORAL at 00:20

## 2022-11-29 RX ADMIN — Medication 1 ENEMA: at 00:56

## 2023-10-24 NOTE — H&P ADULT - NSHPPOAPRESSUREULCER_GEN_ALL_CORE
[address] [facility] [id number]    GUIDELINES FOR PARENTS  15 MONTH VISIT    AGE: 15 month old        DATE: October 24, 2023     Visit Vitals  Temp 97.2 °F (36.2 °C) (Temporal)   Ht 32.5\" (82.6 cm)   Wt 11.4 kg (25 lb 3 oz)   HC 50 cm (19.69\")   BMI 16.77 kg/m²        Nutritional guidelines    Children normally have a decrease in appetite at 12-18 months as their growth rate slows down. To encourage good eating habits, have them sit in their high chair for each meal (3-4 a day) and snack (1-2 a day).  Limit milk intake to 15 to 20 ounces so as to encourage solid food intake.  You may also try offering fluids after meals.  Offer healthy snacks and limit juice 4 to 6 ounces per day.  Remember children need to be offered water also.  Avoid choking hazard foods and no peanut butter before 2 years old.  Bottles should be discontinued.      Oral health    Maintain a routine with teeth brushing twice daily.  Children can use fluoridated toothpaste when they can spit.  At this age, they will get their fluoride via water (minimum 8 oz per day) or supplements.    Injury prevention      Due to the curiosity and mobility of children at this age, parents must be diligent when monitoring toddlers.  Items such as doors, tablecloths and plants can pose unexpected dangers.  Keep the Poison Control number in an easy-to-access location for all caregivers.    Poison Control [phone]      Development     Children are learning quickly how to communicate at this age.  Reading is an invaluable resource for your child's language development.  Limit TV viewing to less than 1 hour per day.  Have children at this age learn to follow simple commands, point to body parts, animal sounds and point to pictures in board books.  Due to their curiosity, rotate toys to keep them interested.  Remember children learn what they live.  This is a good time to reflect on habits or behaviors in your household which may need to be altered so as  to not negatively impact a developing toddler.      Behavior/Discipline     Due to their curiosity, children at this age should try to have their environment controlled at much as possible.  This will limit the number of times they are told \"no\" and will be less stressful for parents.  Allow them to explore the safe world around them to which they have access.  Avoid placing objects such as TVs, plants, etc., in their environment, as they will likely find them stimulating and desire to \"investigate\" when you would prefer they did not.  Avoid any physical punishment, as toddlers will learn this as an acceptable action.  Time-outs should be brief at this age, as they are first being introduced.  Distraction is very effective.  In addition, do not forget to get in a practice of reinforcing positive behavior.     Discipline basics    Definition  The first goal of discipline is to protect your child from danger.  Another very important goal is to teach your child an understanding of right and wrong.  Good discipline gradually changes from a self-centered child into a mature adult who is thoughtful and respectful of others, assertive without being hostile, and in control of his or her impulses.  Reasonable limit-setting keeps us from raising a \"spoiled\" child.  The word discipline means \"to teach.\"  It does not mean \"to punish.\"    To teach respect for the rights of others, first teach your child about parents' rights.  Children need parents who are \"in charge.\" Begin external controls by 6 months of age.  Children start to develop internal controls (self-control) at 3 or 4 years of age.  They continue to need controls, in gradually decreasing amounts, through adolescence.    If your child, has several discipline problems or is out of control, start reading the next section \"How to begin a discipline program.\"  If you want to learn more about normal discipline, refer to the upcoming section \"Guidelines for setting  rules.\"    How to begin a discipline program    List problem behaviors.  What do you want to change? Over the next 3 or 4 days, note and write down your child's inappropriate or annoying behavior traits.    Set priorities for correcting the problem behavior.  Some misbehavior needs immediate attention; for instance, that might harm your child or others.  Some behavior is too annoying or obnoxious to be ignored (such as not going to bed).  Some unpleasant behavior (such as negativism, that is, saying \"No\" all of the time between age 2 and 3) is normal and should be tolerated.  Some families with a child who is out of control have too many rules and need to think about what misbehavior can be overlooked.      Write some house rules about the most important kinds of misbehavior.  Refer to the next section, \"Guidelines for setting rules.\"    Decide what punishment you will use for each type of misbehavior.  All behavior, good and bad, is mainly affected (or shaped) by consequences.  If the consequence is pleasant (for example, a reward or praise), the child is more likely to repeat the behavior.  If the consequence is unpleasant (a punishment), the child is less likely to do the same thing again.    Young children usually do not respond to lectures or reminders.  Actions speak louder than words.  The most effective actions are ignoring the misbehavior, redirecting the child to appropriate behavior or giving your child a time-out.    For further information on forms of punishment, see the section, \"Discipline techniques.\"    Temporarily discontinue any physical punishment.   Most out-of-control children are already too aggressive.  Physical punishment teaches them it's okay to be aggressive (e.g., hit or hurt someone else) to solve problems.    Discontinue any yelling.  Yelling and screaming teach your child to yell back; you are hereby legitimizing shouting matches.  Your child will sense from your yelling you are not  feeling in charge.  Yelling often escalates the disagreement into a win-lost gomez.  Your child will respond better in the long run to a pleasant tone of voice and words of diplomacy.    Don't take your child to public places until his or her behavior is under control at home.  Misbehaving children are usually more difficult to control in a shopping mall or supermarket than at home.  Leave your child with a  or spouse when you need to go to these places.    [address] [facility] [id number]    GUIDELINES FOR PARENTS  15 MONTH VISIT    AGE: 15 month old        DATE: October 24, 2023     Visit Vitals  Temp 97.2 °F (36.2 °C) (Temporal)   Ht 32.5\" (82.6 cm)   Wt 11.4 kg (25 lb 3 oz)   HC 50 cm (19.69\")   BMI 16.77 kg/m²        Nutritional guidelines    Children normally have a decrease in appetite at 12-18 months as their growth rate slows down. To encourage good eating habits, have them sit in their high chair for each meal (3-4 a day) and snack (1-2 a day).  Limit milk intake to 15 to 20 ounces so as to encourage solid food intake.  You may also try offering fluids after meals.  Offer healthy snacks and limit juice 4 to 6 ounces per day.  Remember children need to be offered water also.  Avoid choking hazard foods and no peanut butter before 2 years old.  Bottles should be discontinued.      Oral health    Maintain a routine with teeth brushing twice daily.  Children can use fluoridated toothpaste when they can spit.  At this age, they will get their fluoride via water (minimum 8 oz per day) or supplements.    Injury prevention      Due to the curiosity and mobility of children at this age, parents must be diligent when monitoring toddlers.  Items such as doors, tablecloths and plants can prose unexpected dangers.  Keep the Poison Control number in an easy-to-access location for all caregivers.    Poison Control [phone]      Development     Children are learning quickly how to communicate at this  age.  Reading is an invaluable resource for your child's language development.  Limit TV viewing to less than 1 hour per day.  Have children at this age learn to follow simple commands, point to body parts, animal sounds and point to pictures in board books.  Due to their curiosity, rotate toys to keep them interested.  Remember children learn what they live.  This is a good time to reflect on habits or behaviors in your household which may need to be altered so as to not negatively impact a developing toddler.      Behavior/Discipline     Due to their curiosity, children at this age should try to have their environment controlled at much as possible.  This will limit the number of times they are told \"no\" and will be less stressful for parents.  Allow them to explore the safe world around them to which they have access.  Avoid placing objects such as TVs, VCRs, plants, etc., in their environment, as they will likely find them stimulating and desire to \"investigate\" when you would prefer they did not.  Avoid any physical punishment, as toddlers will learn this as an acceptable action.  Time-outs should be brief at this age, as they are first being introduced.  Distraction is very effective.  In addition, do not forget to get in a practice of reinforcing positive behavior.     Discipline basics    Definition  The first goal of discipline is to protect your child from danger.  Another very important goal is to teach your child as understanding of right and wrong.  Good discipline gradually changes as a self-centered child into a mature adult who is thoughtful and respectful of others, assertive without being hostile, and in control of his or her impulses.  Reasonable limit-setting keeps us from raising a \"spoiled\" child.  The word discipline means \"to teach.\"  It does not mean \"to punish.\"    To teach respect for the rights of others, first teach your child about parents' rights.  Children need parents who are \"in  charge.\" Begin external controls by 6 months of age.  Children start to develop internal controls (self control) at 3 or 4 years of age.  They continue to need controls, in gradually decreasing amounts, through adolescence.    If your child, has several discipline problems or is out of control, start reading the next section \"How to begin a discipline program.\"  If you want to learn more about normal discipline, refer to the upcoming section \"Guidelines for setting rules.\"    How to begin a discipline program    List problem behaviors.  What do you want to change? Over the next 3 or 4 days, note and write down your child's inappropriate or annoying behavior traits.    Set priorities for correcting the problem behavior.  Some misbehavior needs immediate attention; for instance, that might harm your child or others.  Some behavior is too annoying or obnoxious to be ignored (such as not going to bed).  Some unpleasant behavior (such as negativism, that is, saying \"No\" all of the time between age 2 and 3) is normal and should be tolerated.  Some families with a child who is out of control have too many rules and need to think about what misbehavior can be overlooked.      Write some house rules about the most important kinds of misbehavior.  Refer to the next section, \"Guidelines for setting rules.\"    Decide what punishment you will use for each type of misbehavior.  All behavior, good and bad, is mainly affected (or shaped) by consequences.  If the consequence if pleasant (for example, a reward or praise), the child is more likely to repeat the behavior.  If the consequence is unpleasant ( a punishment), the child is less likely to do the same thing again.    Young children usually do not respond to lectures or reminders.  Actions speak louder than words.  The most effective actions are ignoring the misbehavior, redirecting the child to appropriate behavior or giving your child a time-out.    For further information  on forms of punishment, see the section, \"Discipline techniques.\"    Temporarily discontinue any physical punishment.   Most out-of-control children are already too aggressive.  Physical punishment teaches them it's okay to be aggressive (e.g., hit or hurt someone else) to solve problems.    Discontinue any yelling.  Yelling and screaming teach your child to yell back; you are hereby legitimizing shouting matches.  Your child will sense from your yelling you are not feeling in charge.  Yelling often escalates the disagreement into a win-lost gomez.  Your child will respond better in the long run to a pleasant tone of voice and words of diplomacy.    Don't take your child to public places until his or her behavior is under control at home.  Misbehaving children are usually more difficult to control in a shopping mall or supermarket than at home.  Leave your child with a  or spouse when you need to go to these places.    Take daily breaks from your child.  Ask you spouse to give you a break from supervising your young child, to take over all the discipline for a few hours.  If this is impossible, hire a teenager for a few times a week to look after your child while you go out.  Also make a \"date\" for a weekly night out with your spouse or a friend.     Give your child more positive feedback.  Children respond to discipline from people they feel loved by and want to please.  Every child needs daily praise, smiles and hugs.  Give your child this increased attention when he or she is not demanding it, especially if the child is behaving well.  Try especially hard to notice the times when your child is being good.  If your child receives more negative comments and criticisms each day than positive responses, you need to restore an emotionally healthy balance by having less rules, criticizing your child less, and giving your child more praise and affection.  Many experts feel it takes several positive contacts  to counter one negative one.    For further information, please see the section, \"Guidelines for positive reinforcement for desired behavior.\"    Protect your child's self-esteem.  Your child's self esteem is more important than how well disciplined he or she is.  Don't discuss your child's discipline problems and your concerns about him or her when your child is around.  Correct your child in a kind way.  Sometimes begin your correction with, \" I'm sorry I can't let you...\" Don't label your child a     \"bad girl\" or \"bad boy.\"  After punishment is over, welcome your child back into family [place], telling him or her all is forgiven.    Guidelines for setting rules    Begin discipline at about 6 months of age.  Newborns don't need any discipline.  Starting at 6 months, however, parents can begin to clarify their own rights. If your child makes it difficult to change a diaper by kicking and wiggling, you can say firmly, \"No, help Mommy change your diaper.\"  By 8 months of your age, children need rules for their own safety.    Express each misbehavior as a clear concrete rule.  Your child may not understand vague descriptions of misbehavior such as \"hyperactive,\" \"irresponsible\" or \"mean.\"  The younger the child, the more concrete the rule must be.  Examples of clear rules are: \"don't push your brother\" and \"don't interrupt me on the telephone.\"    Also state the acceptable, desired, adaptive or appropriate behavior.  Your child needs to know what is expected of him or her.  Examples are: \"play with your brother\" or \"look at books when I'm on the telephone\" or \"walk , don't run.\" Make your praise of good behavior specific; for example, \"thank you for being quiet.\"    Ignore unimportant or irrelevant misbehavior.  The more rules you have, the less likely your child is to obey them.  Constant criticism usually doesn't work.  Behavior such as swinging the legs, poor table manners or normal negativism is unimportant during  the early years.    Use rules that are fair and attainable.  Rules must fit your child's age.  A child should not be punished for clumsiness when he or she is learning to walk, or for poor pronunciation when the child is learning to speak.  In addition, a child should not be punished for behavior that is part of normal emotional development such as thumb-sucking, fears of being  from his or her parents, and toilet training accidents.    Concentrate on two or three rules initially.  Give highest priority to issues of safety, such as not running into the street, and preventing harm to others.  Of next importance is behavior that damages property.  Then come all the annoying behavior traits that wear you down.     Avoid trying to change \"no win\" power struggles through punishment.  \"No win behavior\" is behavior that usually cannot be controlled by the parent if the child decides to continue it.  Examples are wetting pants, hair pulling, thumb-sucking, body rocking, masturbation, not eating enough, not going to sleep, and refusal to complete schoolwork.  The first step in resolving such a power struggle is to withdraw from the conflict and stop punishing your child for the misbehavior.  Then give your child positive reinforcement, such as praise, when he or she behaves as you'd like.     For information on positive reinforcement, see the section, \"Guidelines for positive reinforcement of desired behavior.\"     Apply the rules consistently.  After the parents agree on the rules, it may be helpful to write them down and post them in a conspicuous place in the home.    Discipline techniques (including consequences)     Summary of techniques to use for different ages.  The techniques mentioned here are further described after this list.    From birth to 6 months: no discipline necessary.  From 6 months to 3 years: structuring the home environment, distracting, verbal and nonverbal disapproval, moving or escorting,  and temporary time-out.  From 3 years to 5 years: the preceding techniques (especially temporary time-out,) plus natural consequences, restricting places where the child can misbehave, and logical consequences.  From 5 years to adolescence: the preceding techniques plus delay of a privilege, \"I\" messages, and negotiation and family conferences.  Structuring the environment and distraction can be discontinued.  Adolescence: logical consequences, \"I\" messages, and family conferences about house rules.  By the time your child is an adolescent, you should stop using manual guidance and time-out techniques.     Structuring the home environment.  You can change your child's surroundings so an object or situation that could cause a problem is eliminated.  Examples are:  Putting breakables out of reach, fencing in a yard, setting up morocho, putting locks on a special desk or locking certain rooms.    Distracting your child from misbehavior.  Distracting a young child from temptation by attracting his or her attention to something else is especially helpful when the child is in someone else's house, a physician's office, or a store.  It would be difficult to use other options for discipline (such as time-out) in such places.  You may also want to give your child something to distract him or her from trouble if you're going to be busy at home with guests, the telephone or feeding a baby.  Most children can be distracted with toys or food. School-age children may need books, games or other activities to keep their attention.  Distracting is also called \"diverting\" or \"redirecting.\"    Ignoring the misbehavior.  Ignoring stops unacceptable behavior that is harmless, such as tantrums, sulking, whining, quarreling or interrupting.  The proper way to ignore the behavior is to move away from your child, turn your back, avoid eye contact, and stop any conversation with your child.  Ignore any protests or excuses.  Sometimes you may  need to leave the area where your child is misbehaving.  Ignoring also called extinction.    Verbal and nonverbal disapproval.  Mild disapproval is often all that is required to stop a child's misbehavior.  Get close to your child, get eye contact, look arch[place] and give a brief, direct instruction, such as \"no\" or \"stop.\"  You can speak in a disapproving but soft tone because you are close to your child.  Show your child what you want him or her to do.  You may want to underscore that you are serious by pointing or shaking your finger.  The most     common mistake parents make when they use this technique is smiling or laughing.    Moving or escorting (manual guidance).  \"Manual guidance means you move a child from one place to another against his or her will.  Sometimes children must be physically removed from a place where they are causing trouble to a time-out chair.  At other times they must be taken to bed, bath or car if they refuse to go on their own.  Guide your child by the hand or forearm.  If your child refuses to be led, pick the child up from behind and carry him or her    Temporary time-out or social isolation.  Time-out removes the child from the scene of the unacceptable behavior to a boring place (for example, a playpen, corner or chair or bedroom.)  Time-out is the most effective discipline technique available to parents for dealing with misbehaving infants and young children.  Time-outs should last about 1 minute per year of age and not more than 5 minutes.    Natural consequences.  By experiencing the natural consequences of his or her own actions, your child learns good behavior from the natural laws of the physical world.  Examples are: coming to dinner late means the food will be cold; not dressing properly for the weather means your child will be cold or wet; not wearing mittens while playing in the snow will lead to cold hands; running on ice can lead to falling down.  Putting sand in the  mouth leads to an unpleasant taste; breaking a toy means it isn't fun to play with anymore; and going to bed late means sleepy in the morning.  Although it is very helpful for children to learn from their mistakes, it is important that they not be allowed to do anything that could hurt them or others, such as playing with matches or running into the street.    Make the punishment brief.  Take toys out of circulation for no more than 1 or 2 days.  Time-outs should last no longer than 1 minute per year of the child's age.      Keep the consequences in proportion to the misbehavior.  Also try to make the sequence relate to the misbehavior (see the discussion of logical consequences in the previous section.)     Follow the consequence with love and trust.  Welcome your child back into the family [place] and do not comment upon the previous misbehavior or require an apology for it.      Direct the punishment against the misbehavior, not the person.  Avoid degrading comments such as, \"You never do anything right.\"    Don't be surprised if, for a short time, your child misbehaves more often once you start disciplining him or her consistently.  Children who are out of control initially go through a phase of testing their parents before they comply with the new system.  This testing usually last 2 to 3 days.    Guidelines for positive reinforcement of desired behavior    Most parents don't give enough positive reinforcement, especially touching and hugs. Don't take good behavior for granted.  Watch for behavior you like, then praise your child by saying things as, \"I like the way you ...\" or \"I appreciate ...\" when you say this, move close to your child, look at him or her, smile, and be affectionate.  A parent's affection and attention is the favorite reward of most children.    There are two kinds of positive reinforcement: social and material.  Social positive reinforcement, such as praise, should be used when your child  behaves in a desired way.  Praise the behavior, not the behavior, not the person.  Examples are: sharing toys, having good manners, doing chores, playing cooperatively, treating the baby gently, petting dog gently, being a good sport, cleaning the room or reading a book.  Your child can also be praised for trying, such as trying to use the potty or attempting something difficult like a puzzle.  Praise will make your child want to behave well more often.  Try to \"catch\" your child being good, and comment on it three or more times for every one time you discipline or criticize your child.    Material reinforcers are often candy, animal crackers, money or video time.  Use material reinforcers as incentives or increase the frequency of more responsible behavior.  They may be useful in overcoming resistance when children are entrenched in power struggles around \"no-win\" behaviors (for example, wetting or soiling their pants.)  They should be phased out and replaced by natural (social) reinforcers as soon as possible.          Call our office during regular hours if:  Your child's misbehavior is dangerous.  The instances of misbehavior seem to numerous to count.  Your child is also having behavior problems at school.  Your child doesn't seem to have many good points.\  Your child seems depressed.  The parent's can't agree on discipline.  You can't give up physical punishment (Note: call immediately if you are afraid you might hurt your child).  The misbehavior does not improve after 1 month of using this approach.      Written by CASEY Huynh MD, author of \"Your Child's Health,\" [employer]. Copyright 2005, [employer]    Take daily breaks from your child.  Ask your spouse to give you a break from supervising your young child, to take over all the discipline for a few hours.  If this is impossible, hire a teenager for a few times a week to look after your child while you go out.  Also make a \"date\"  for a weekly night out with your spouse or a friend.     Give your child more positive feedback.  Children respond to discipline from people they feel loved by and want to please.  Every child needs daily praise, smiles and hugs.  Give your child this increased attention when he or she is not demanding it, especially if the child is behaving well.  Try especially hard to notice the times when your child is being good.  If your child receives more negative comments and criticisms each day than positive responses, you need to restore an emotionally healthy balance by having less rules, criticizing your child less, and giving your child more praise and affection.  Many experts feel it takes several positive contacts to counter one negative one.    For further information, please see the section, \"Guidelines for positive reinforcement for desired behavior.\"    Protect your child's self-esteem.  Your child's self-esteem is more important than how well disciplined he or she is.  Don't discuss your child's discipline problems and your concerns about him or her when your child is around.  Correct your child in a kind way.  Sometimes begin your correction with, \"I'm sorry I can't let you...\" Don't label your child a     \"bad girl\" or \"bad boy.\"  After punishment is over, welcome your child back into the family [place], telling him or her all is forgiven.    Guidelines for setting rules    Begin discipline at about 6 months of age.  Newborns don't need any discipline.  Starting at 6 months, however, parents can begin to clarify their own rights. If your child makes it difficult to change a diaper by kicking and wiggling, you can say firmly, \"No, help Mommy change your diaper.\"  By 8 months of age, children need rules for their own safety.    Express each misbehavior as a clear concrete rule.  Your child may not understand vague descriptions of misbehavior such as \"hyperactive,\" \"irresponsible\" or \"mean.\"  The younger the  child, the more concrete the rule must be.  Examples of clear rules are: \"don't push your brother\" and \"don't interrupt me on the telephone.\"    Also state the acceptable, desired, adaptive or appropriate behavior.  Your child needs to know what is expected of him or her.  Examples are: \"play with your brother\" or \"look at books when I'm on the telephone\" or \"walk , don't run.\" Make your praise of good behavior specific; for example, \"thank you for being quiet.\"    Ignore unimportant or irrelevant misbehavior.  The more rules you have, the less likely your child is to obey them.  Constant criticism usually doesn't work.  Behavior such as swinging the legs, poor table manners or normal negativism is unimportant during the early years.    Use rules that are fair and attainable.  Rules must fit your child's age.  A child should not be punished for clumsiness when he or she is learning to walk, or for poor pronunciation when the child is learning to speak.  In addition, a child should not be punished for behavior that is part of normal emotional development such as thumb-sucking, fears of being  from his or her parents, and toilet training accidents.    Concentrate on two or three rules initially.  Give highest priority to issues of safety, such as not running into the street, and preventing harm to others.  Of next importance is behavior that damages property.  Then come all the annoying behavior traits that wear you down.     Avoid trying to change \"no win\" power struggles through punishment.  \"No win behavior\" is behavior that usually cannot be controlled by the parent if the child decides to continue it.  Examples are wetting pants, hair pulling, thumb-sucking, body rocking, masturbation, not eating enough, not going to sleep, and refusal to complete schoolwork.  The first step in resolving such a power struggle is to withdraw from the conflict and stop punishing your child for the misbehavior.  Then give  your child positive reinforcement, such as praise, when he or she behaves as you'd like.     For information on positive reinforcement, see the section, \"Guidelines for positive reinforcement of desired behavior.\"     Apply the rules consistently.  After the parents agree on the rules, it may be helpful to write them down and post them in a conspicuous place in the home.    Discipline techniques (including consequences)     Summary of techniques to use for different ages.  The techniques mentioned here are further described after this list.    From birth to 6 months: no discipline necessary.  From 6 months to 3 years: structuring the home environment, distracting, verbal and nonverbal disapproval, moving or escorting, and temporary time-out.  From 3 years to 5 years: the preceding techniques (especially temporary time-out,) plus natural consequences, restricting places where the child can misbehave, and logical consequences.  From 5 years to adolescence: the preceding techniques plus delay of a privilege, \"I\" messages, and negotiation and family conferences.  Structuring the environment and distraction can be discontinued.  Adolescence: logical consequences, \"I\" messages, and family conferences about house rules.  By the time your child is an adolescent, you should stop using manual guidance and time-out techniques.     Structuring the home environment.  You can change your child's surroundings so an object or situation that could cause a problem is eliminated.  Examples are:  Putting breakables out of reach, fencing in a yard, setting up morocho, putting locks on a special desk or locking certain rooms.    Distracting your child from misbehavior.  Distracting a young child from temptation by attracting his or her attention to something else is especially helpful when the child is in someone else's house, a physician's office, or a store.  It would be difficult to use other options for discipline (such as time-out) in  such places.  You may also want to give your child something to distract him or her from trouble if you're going to be busy at home with guests, the telephone or feeding a baby.  Most children can be distracted with toys or food. School-age children may need books, games or other activities to keep their attention.  Distracting is also called \"diverting\" or \"redirecting.\"    Ignoring the misbehavior.  Ignoring stops unacceptable behavior that is harmless, such as tantrums, sulking, whining, quarreling or interrupting.  The proper way to ignore the behavior is to move away from your child, turn your back, avoid eye contact, and stop any conversation with your child.  Ignore any protests or excuses.  Sometimes you may need to leave the area where your child is misbehaving.  Ignoring also called extinction.    Verbal and nonverbal disapproval.  Mild disapproval is often all that is required to stop a child's misbehavior.  Get close to your child, get eye contact, look arch[place] and give a brief, direct instruction, such as \"no\" or \"stop.\"  You can speak in a disapproving but soft tone because you are close to your child.  Show your child what you want him or her to do.  You may want to underscore that you are serious by pointing or shaking your finger.  The most     common mistake parents make when they use this technique is smiling or laughing.    Moving or escorting (manual guidance).  \"Manual guidance means you move a child from one place to another against his or her will.  Sometimes children must be physically removed from a place where they are causing trouble to a time-out chair.  At other times they must be taken to bed, bath or car if they refuse to go on their own.  Guide your child by the hand or forearm.  If your child refuses to be led, pick the child up from behind and carry him or her    Temporary time-out or social isolation.  Time-out removes the child from the scene of the unacceptable behavior to a  boring place (for example, a playpen, corner or chair or bedroom.)  Time-out is the most effective discipline technique available to parents for dealing with misbehaving infants and young children.  Time-outs should last about 1 minute per year of age and not more than 5 minutes.    Natural consequences.  By experiencing the natural consequences of his or her own actions, your child learns good behavior from the natural laws of the physical world.  Examples are: coming to dinner late means the food will be cold; not dressing properly for the weather means your child will be cold or wet; not wearing mittens while playing in the snow will lead to cold hands; running on ice can lead to falling down.  Putting sand in the mouth leads to an unpleasant taste; breaking a toy means it isn't fun to play with anymore; and going to bed late means sleepy in the morning.  Although it is very helpful for children to learn from their mistakes, it is important that they not be allowed to do anything that could hurt them or others, such as playing with matches or running into the street.    Make the punishment brief.  Take toys out of circulation for no more than 1 or 2 days.  Time-outs should last no longer than 1 minute per year of the child's age.      Keep the consequences in proportion to the misbehavior.  Also try to make the sequence relate to the misbehavior (see the discussion of logical consequences in the previous section.)     Follow the consequence with love and trust.  Welcome your child back into the family [place] and do not comment upon the previous misbehavior or require an apology for it.      Direct the punishment against the misbehavior, not the person.  Avoid degrading comments such as, \"You never do anything right.\"    Don't be surprised if, for a short time, your child misbehaves more often once you start disciplining him or her consistently.  Children who are out of control initially go through a phase of  testing their parents before they comply with the new system.  This testing usually last 2 to 3 days.    Guidelines for positive reinforcement of desired behavior    Most parents don't give enough positive reinforcement, especially touching and hugs. Don't take good behavior for granted.  Watch for behavior you like, then praise your child by saying things as, \"I like the way you ...\" or \"I appreciate ...\" when you say this, move close to your child, look at him or her, smile, and be affectionate.  A parent's affection and attention is the favorite reward of most children.    There are two kinds of positive reinforcement: social and material.  Social positive reinforcement, such as praise, should be used when your child behaves in a desired way.  Praise the behavior, not the person.  Examples are: sharing toys, having good manners, doing chores, playing cooperatively, treating the baby gently, petting dog gently, being a good sport, cleaning the room or reading a book.  Your child can also be praised for trying, such as trying to use the potty or attempting something difficult like a puzzle.  Praise will make your child want to behave well more often.  Try to \"catch\" your child being good, and comment on it three or more times for every one time you discipline or criticize your child.    Material reinforcers are often candy, animal crackers, money or video time.  Use material reinforcers as incentives or increase the frequency of more responsible behavior.  They may be useful in overcoming resistance when children are entrenched in power struggles around \"no-win\" behaviors (for example, wetting or soiling their pants.)  They should be phased out and replaced by natural (social) reinforcers as soon as possible.          Call our office during regular hours if:  Your child's misbehavior is dangerous.  The instances of misbehavior seem to numerous too count.  Your child is also having behavior problems at school.  Your  child doesn't seem to have many good points.\  Your child seems depressed.  The parent's can't agree on discipline.  You can't give up physical punishment (Note: call immediately if you are afraid you might hurt your child).  The misbehavior does not improve after 1 month of using this approach.      Written by CASEY Huynh MD, author of \"Your Child's Health,\" [employer]. Copyright 2005, [employer]   no
